# Patient Record
Sex: MALE | Race: NATIVE HAWAIIAN OR OTHER PACIFIC ISLANDER | Employment: FULL TIME | URBAN - METROPOLITAN AREA
[De-identification: names, ages, dates, MRNs, and addresses within clinical notes are randomized per-mention and may not be internally consistent; named-entity substitution may affect disease eponyms.]

---

## 2017-04-17 RX ORDER — INDOMETHACIN 50 MG/1
CAPSULE ORAL
Qty: 20 CAPSULE | Refills: 0 | Status: SHIPPED | OUTPATIENT
Start: 2017-04-17 | End: 2018-11-16

## 2017-05-31 ENCOUNTER — TELEPHONE (OUTPATIENT)
Dept: FAMILY MEDICINE CLINIC | Age: 37
End: 2017-05-31

## 2017-05-31 RX ORDER — COLCHICINE 0.6 MG/1
0.6 TABLET ORAL 2 TIMES DAILY
Qty: 10 TABLET | Refills: 3 | Status: SHIPPED | OUTPATIENT
Start: 2017-05-31 | End: 2019-09-04 | Stop reason: ALTCHOICE

## 2018-05-14 ENCOUNTER — TELEPHONE (OUTPATIENT)
Dept: FAMILY MEDICINE CLINIC | Age: 38
End: 2018-05-14

## 2018-05-18 ENCOUNTER — TELEPHONE (OUTPATIENT)
Dept: FAMILY MEDICINE CLINIC | Age: 38
End: 2018-05-18

## 2018-05-18 DIAGNOSIS — Z00.00 ANNUAL PHYSICAL EXAM: Primary | ICD-10-CM

## 2018-05-25 ENCOUNTER — OFFICE VISIT (OUTPATIENT)
Dept: FAMILY MEDICINE CLINIC | Age: 38
End: 2018-05-25

## 2018-05-25 VITALS
BODY MASS INDEX: 30.69 KG/M2 | SYSTOLIC BLOOD PRESSURE: 130 MMHG | HEART RATE: 83 BPM | OXYGEN SATURATION: 95 % | DIASTOLIC BLOOD PRESSURE: 84 MMHG | WEIGHT: 207.8 LBS | RESPIRATION RATE: 14 BRPM

## 2018-05-25 DIAGNOSIS — Z31.69 INFERTILITY COUNSELING: ICD-10-CM

## 2018-05-25 DIAGNOSIS — A64 STD (MALE): ICD-10-CM

## 2018-05-25 DIAGNOSIS — Z00.00 ANNUAL PHYSICAL EXAM: Primary | ICD-10-CM

## 2018-05-25 PROCEDURE — 99395 PREV VISIT EST AGE 18-39: CPT | Performed by: FAMILY MEDICINE

## 2018-05-25 ASSESSMENT — PATIENT HEALTH QUESTIONNAIRE - PHQ9
SUM OF ALL RESPONSES TO PHQ QUESTIONS 1-9: 0
SUM OF ALL RESPONSES TO PHQ9 QUESTIONS 1 & 2: 0
1. LITTLE INTEREST OR PLEASURE IN DOING THINGS: 0
2. FEELING DOWN, DEPRESSED OR HOPELESS: 0

## 2018-05-28 ASSESSMENT — ENCOUNTER SYMPTOMS
ALLERGIC/IMMUNOLOGIC NEGATIVE: 1
EYES NEGATIVE: 1
GASTROINTESTINAL NEGATIVE: 1
RESPIRATORY NEGATIVE: 1

## 2018-05-29 DIAGNOSIS — A64 STD (MALE): ICD-10-CM

## 2018-05-29 DIAGNOSIS — Z00.00 ANNUAL PHYSICAL EXAM: ICD-10-CM

## 2018-05-29 LAB
ALBUMIN SERPL-MCNC: 4.6 G/DL (ref 3.4–5)
ALP BLD-CCNC: 50 U/L (ref 40–129)
ALT SERPL-CCNC: 38 U/L (ref 10–40)
ANION GAP SERPL CALCULATED.3IONS-SCNC: 14 MMOL/L (ref 3–16)
AST SERPL-CCNC: 33 U/L (ref 15–37)
BILIRUB SERPL-MCNC: 0.7 MG/DL (ref 0–1)
BILIRUBIN DIRECT: <0.2 MG/DL (ref 0–0.3)
BILIRUBIN, INDIRECT: NORMAL MG/DL (ref 0–1)
BUN BLDV-MCNC: 11 MG/DL (ref 7–20)
CALCIUM SERPL-MCNC: 9.2 MG/DL (ref 8.3–10.6)
CHLORIDE BLD-SCNC: 102 MMOL/L (ref 99–110)
CHOLESTEROL, TOTAL: 210 MG/DL (ref 0–199)
CO2: 24 MMOL/L (ref 21–32)
CREAT SERPL-MCNC: 0.9 MG/DL (ref 0.9–1.3)
GFR AFRICAN AMERICAN: >60
GFR NON-AFRICAN AMERICAN: >60
GLUCOSE BLD-MCNC: 129 MG/DL (ref 70–99)
HCT VFR BLD CALC: 47 % (ref 40.5–52.5)
HDLC SERPL-MCNC: 48 MG/DL (ref 40–60)
HEMOGLOBIN: 16.3 G/DL (ref 13.5–17.5)
HEPATITIS B SURFACE ANTIGEN INTERPRETATION: NORMAL
HEPATITIS C ANTIBODY INTERPRETATION: NORMAL
LDL CHOLESTEROL CALCULATED: 120 MG/DL
MCH RBC QN AUTO: 30.8 PG (ref 26–34)
MCHC RBC AUTO-ENTMCNC: 34.7 G/DL (ref 31–36)
MCV RBC AUTO: 88.6 FL (ref 80–100)
PDW BLD-RTO: 13.2 % (ref 12.4–15.4)
PLATELET # BLD: 220 K/UL (ref 135–450)
PMV BLD AUTO: 8.3 FL (ref 5–10.5)
POTASSIUM SERPL-SCNC: 4.5 MMOL/L (ref 3.5–5.1)
RBC # BLD: 5.31 M/UL (ref 4.2–5.9)
SODIUM BLD-SCNC: 140 MMOL/L (ref 136–145)
TOTAL PROTEIN: 6.9 G/DL (ref 6.4–8.2)
TRIGL SERPL-MCNC: 208 MG/DL (ref 0–150)
TSH SERPL DL<=0.05 MIU/L-ACNC: 1.42 UIU/ML (ref 0.27–4.2)
URIC ACID, SERUM: 9.7 MG/DL (ref 3.5–7.2)
VLDLC SERPL CALC-MCNC: 42 MG/DL
WBC # BLD: 6.9 K/UL (ref 4–11)

## 2018-05-30 LAB
C. TRACHOMATIS DNA ,URINE: NEGATIVE
HIV AG/AB: NORMAL
HIV ANTIGEN: NORMAL
HIV-1 ANTIBODY: NORMAL
HIV-2 AB: NORMAL
N. GONORRHOEAE DNA, URINE: NEGATIVE
RPR: NORMAL

## 2018-08-07 ENCOUNTER — OFFICE VISIT (OUTPATIENT)
Dept: FAMILY MEDICINE CLINIC | Age: 38
End: 2018-08-07

## 2018-08-07 VITALS
DIASTOLIC BLOOD PRESSURE: 78 MMHG | RESPIRATION RATE: 14 BRPM | WEIGHT: 204.2 LBS | OXYGEN SATURATION: 95 % | SYSTOLIC BLOOD PRESSURE: 122 MMHG | HEART RATE: 78 BPM | BODY MASS INDEX: 30.16 KG/M2

## 2018-08-07 DIAGNOSIS — M25.512 ACUTE PAIN OF LEFT SHOULDER: Primary | ICD-10-CM

## 2018-08-07 PROCEDURE — G8427 DOCREV CUR MEDS BY ELIG CLIN: HCPCS | Performed by: FAMILY MEDICINE

## 2018-08-07 PROCEDURE — G8417 CALC BMI ABV UP PARAM F/U: HCPCS | Performed by: FAMILY MEDICINE

## 2018-08-07 PROCEDURE — 99214 OFFICE O/P EST MOD 30 MIN: CPT | Performed by: FAMILY MEDICINE

## 2018-08-07 PROCEDURE — 4004F PT TOBACCO SCREEN RCVD TLK: CPT | Performed by: FAMILY MEDICINE

## 2018-08-07 RX ORDER — MELOXICAM 15 MG/1
15 TABLET ORAL DAILY
Qty: 30 TABLET | Refills: 3 | Status: SHIPPED | OUTPATIENT
Start: 2018-08-07 | End: 2018-11-16

## 2018-08-07 ASSESSMENT — ENCOUNTER SYMPTOMS
EYES NEGATIVE: 1
RESPIRATORY NEGATIVE: 1
GASTROINTESTINAL NEGATIVE: 1

## 2018-08-07 NOTE — PROGRESS NOTES
deformity, no laceration, no spasm and normal pulse. Neurological: He is alert and oriented to person, place, and time. Vitals reviewed. ASSESSMENT:   Diagnosis Orders   1.  Acute pain of left shoulder  meloxicam (MOBIC) 15 MG tablet       PLAN:  See orders, exercise sheet is given to the patient  If no improvement my need to see Ortho

## 2018-08-07 NOTE — PATIENT INSTRUCTIONS
Patient Education        Shoulder Pain: Care Instructions  Your Care Instructions    You can hurt your shoulder by using it too much during an activity, such as fishing or baseball. It can also happen as part of the everyday wear and tear of getting older. Shoulder injuries can be slow to heal, but your shoulder should get better with time. Your doctor may recommend a sling to rest your shoulder. If you have injured your shoulder, you may need testing and treatment. Follow-up care is a key part of your treatment and safety. Be sure to make and go to all appointments, and call your doctor if you are having problems. It's also a good idea to know your test results and keep a list of the medicines you take. How can you care for yourself at home? · Take pain medicines exactly as directed. ¨ If the doctor gave you a prescription medicine for pain, take it as prescribed. ¨ If you are not taking a prescription pain medicine, ask your doctor if you can take an over-the-counter medicine. ¨ Do not take two or more pain medicines at the same time unless the doctor told you to. Many pain medicines contain acetaminophen, which is Tylenol. Too much acetaminophen (Tylenol) can be harmful. · If your doctor recommends that you wear a sling, use it as directed. Do not take it off before your doctor tells you to. · Put ice or a cold pack on the sore area for 10 to 20 minutes at a time. Put a thin cloth between the ice and your skin. · If there is no swelling, you can put moist heat, a heating pad, or a warm cloth on your shoulder. Some doctors suggest alternating between hot and cold. · Rest your shoulder for a few days. If your doctor recommends it, you can then begin gentle exercise of the shoulder, but do not lift anything heavy. When should you call for help? Call 911 anytime you think you may need emergency care. For example, call if:    · You have chest pain or pressure.  This may occur with:  ¨ Sweating. ¨ Shortness of breath. ¨ Nausea or vomiting. ¨ Pain that spreads from the chest to the neck, jaw, or one or both shoulders or arms. ¨ Dizziness or lightheadedness. ¨ A fast or uneven pulse. After calling 911, chew 1 adult-strength aspirin. Wait for an ambulance. Do not try to drive yourself.     · Your arm or hand is cool or pale or changes color.    Call your doctor now or seek immediate medical care if:    · You have signs of infection, such as:  ¨ Increased pain, swelling, warmth, or redness in your shoulder. ¨ Red streaks leading from a place on your shoulder. ¨ Pus draining from an area of your shoulder. ¨ Swollen lymph nodes in your neck, armpits, or groin. ¨ A fever.    Watch closely for changes in your health, and be sure to contact your doctor if:    · You cannot use your shoulder.     · Your shoulder does not get better as expected. Where can you learn more? Go to https://OROS.Flocktory. org and sign in to your [employer] account. Enter Z046 in the Pro V&V box to learn more about \"Shoulder Pain: Care Instructions. \"     If you do not have an account, please click on the \"Sign Up Now\" link. Current as of: November 29, 2017  Content Version: 11.6  © 0714-3220 [employer]. Care instructions adapted under license by [facility] ([facility]). If you have questions about a medical condition or this instruction, always ask your healthcare professional. [place] [address] any warranty or liability for your use of this information. Patient Education        Shoulder Stretches: Exercises  Your Care Instructions  Here are some examples of exercises for your shoulder. Start each exercise slowly. Ease off the exercise if you start to have pain. Your doctor or physical therapist will tell you when you can start these exercises and which ones will work best for you.   How to do the exercises  Shoulder stretch    1.  a doorway and

## 2018-08-24 ENCOUNTER — OFFICE VISIT (OUTPATIENT)
Dept: FAMILY MEDICINE CLINIC | Age: 38
End: 2018-08-24

## 2018-08-24 VITALS
DIASTOLIC BLOOD PRESSURE: 62 MMHG | RESPIRATION RATE: 14 BRPM | OXYGEN SATURATION: 97 % | BODY MASS INDEX: 30.95 KG/M2 | HEART RATE: 62 BPM | WEIGHT: 209.6 LBS | SYSTOLIC BLOOD PRESSURE: 108 MMHG

## 2018-08-24 DIAGNOSIS — M25.512 ACUTE PAIN OF LEFT SHOULDER: Primary | ICD-10-CM

## 2018-08-24 PROCEDURE — G8427 DOCREV CUR MEDS BY ELIG CLIN: HCPCS | Performed by: FAMILY MEDICINE

## 2018-08-24 PROCEDURE — G8417 CALC BMI ABV UP PARAM F/U: HCPCS | Performed by: FAMILY MEDICINE

## 2018-08-24 PROCEDURE — 4004F PT TOBACCO SCREEN RCVD TLK: CPT | Performed by: FAMILY MEDICINE

## 2018-08-24 PROCEDURE — 99212 OFFICE O/P EST SF 10 MIN: CPT | Performed by: FAMILY MEDICINE

## 2018-08-24 ASSESSMENT — ENCOUNTER SYMPTOMS
RESPIRATORY NEGATIVE: 1
EYES NEGATIVE: 1
GASTROINTESTINAL NEGATIVE: 1

## 2018-08-24 NOTE — PROGRESS NOTES
SUBJECTIVE:  Patient ID: Marvin Stevens is a 41 y.o. y.o. male     HPI   Shoulder Pain: Patient is here for follow up on shoulder pain. This is evaluated as a personal injury. The pain is described as aching and sharp. The onset of the pain was gradual, starting about 3 weeks ago. The pain occurs when active and lasts 2 hours. Location is lateral. No history of dislocation. Symptoms are aggravated by lifting, throwing, twisting. Symptoms are diminished by  rest.   PT and mobic didn't help any with his pain still there can't sleep. 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       Social History Main Topics    Smoking status: Former Smoker     Quit date: 9/21/2010    Smokeless tobacco: Current User     Types: Chew    Alcohol use Yes      Comment: binge drinker on weekends    Drug use: No    Sexual activity: Not Asked     Other Topics Concern    None     Social History Narrative    None     Current Outpatient Prescriptions   Medication Sig Dispense Refill    meloxicam (MOBIC) 15 MG tablet Take 1 tablet by mouth daily 30 tablet 3    colchicine (COLCRYS) 0.6 MG tablet Take 1 tablet by mouth 2 times daily 10 tablet 3    indomethacin (INDOCIN) 50 MG capsule TAKE ONE CAPSULE BY MOUTH TWICE A DAY WITH MEALS 20 capsule 0    NONFORMULARY Medication that starts with an H to stop diarrhea.       ondansetron (ZOFRAN ODT) 4 MG disintegrating tablet Take 1 tablet by mouth every 8 hours as needed for Nausea or Vomiting 20 tablet 0    ibuprofen (ADVIL) 200 MG tablet Take 2 tablets by mouth every 8 hours as needed for Pain 60

## 2018-08-31 ENCOUNTER — OFFICE VISIT (OUTPATIENT)
Dept: ORTHOPEDIC SURGERY | Age: 38
End: 2018-08-31

## 2018-08-31 VITALS
WEIGHT: 209 LBS | BODY MASS INDEX: 31.67 KG/M2 | SYSTOLIC BLOOD PRESSURE: 130 MMHG | DIASTOLIC BLOOD PRESSURE: 88 MMHG | HEART RATE: 80 BPM | HEIGHT: 68 IN

## 2018-08-31 DIAGNOSIS — M25.512 LEFT SHOULDER PAIN, UNSPECIFIED CHRONICITY: Primary | ICD-10-CM

## 2018-08-31 DIAGNOSIS — M75.42 ROTATOR CUFF IMPINGEMENT SYNDROME, LEFT: ICD-10-CM

## 2018-08-31 PROCEDURE — 20610 DRAIN/INJ JOINT/BURSA W/O US: CPT | Performed by: ORTHOPAEDIC SURGERY

## 2018-08-31 PROCEDURE — 99203 OFFICE O/P NEW LOW 30 MIN: CPT | Performed by: ORTHOPAEDIC SURGERY

## 2018-08-31 NOTE — PROGRESS NOTES
[address]  Left  Upper Extremity Pain    Chief Complaint    Shoulder Pain (OP/NP left shoulder pain for about a month, states one day he woke up and felt pain in shoulder-then he went to his PCP Dr Clark Lazo given Meloxicam did not help)      History of Present Illness:  Terence Mueller is a 41 y.o. male presents today with left shoulder pain. His progressive shoulder pain has been worsening over the last couple months. He said the pain is bothersome with overhead activities. He has trouble sleeping at night on that side because of the pain. Denies any recent injuries. 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 He denies any instability or paresthesias or numbness. Certain activities and our motions cause him pain. He's had no treatment except for meloxicam which did not seem to help. Pain Assessment  Location of Pain: Shoulder  Location Modifiers: Left  Severity of Pain: 4  Quality of Pain: Aching  Duration of Pain: A few minutes  Frequency of Pain: Intermittent  Aggravating Factors:  (extending arm, certain ROM)  Limiting Behavior: Yes  Result of Injury: No  Work-Related Injury: No  Are there other pain locations you wish to document?: No     Medical History:    No notes on file    Patient's medications, allergies, past medical, surgical, social and family histories were reviewed and updated as appropriate.     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       Social History Main Topics    Smoking status: Former Smoker     Quit date: 9/21/2010    Smokeless tobacco: Current User     Types: Chew    Alcohol use Yes      Comment: binge drinker on weekends    Drug use: No    Sexual activity: Not on file     Other Topics is symmetry without atrophy, no obvious winging    Palpation: Tenderness in the anterior subacromial space, no bicipital groove tenderness no acromioclavicular or sternoclavicular joint tenderness    Active/Passive ROM: Full active forward elevation, external rotation with some limitation in internal rotation compared to contralateral side    Strength: 4/5 strength testing of the supraspinatus and infraspinatus, 5 out of 5 subscapularis and teres minor    Stability: negative sulcus sign, no evidence of instability    Neurovascular: Neurovascularly intact    Special Tests: Positive Cardenas negative speeds negative Wilkin's negative Yergason negative crossarm      Right comparison shoulder exam    Inspection:  No gross deformities, periscapular musculature is symmetry without atrophy, no obvious winging    Palpation: Nontender to palpation about the acromioclavicular joint, rotator cuff footprint or over the biceps groove, or any other bony architecture    Active/Passive ROM: full in forward flexion, abduction, external rotation with the elbow at the side, and internal rotation    Strength: 5/5 strength testing of deltoid, supraspinatus, infraspinatus, teres minor, and subscapularis    Stability: negative sulcus sign, no evidence of instability    Neurovascular: Neurovascularly intact      Imaging: The complete series x-ray of the left shoulder was obtained and reviewed and demonstrates no evidence of acute fracture, soft tissue calcification or any other acute osseous pathology. He doesn't take 2 acromion    Procedure:  After informed consent was provided, the skin was cleansed and prepped. Using a 25 gauge needle an injection with 2 mL of 40 mg/ml Depo-Medrol and 4 mL of 0.5% ropivacaine was injected without difficulty. The needle was withdrawn and the puncture site sealed with a Band-Aid. The patient tolerated the procedure well.     Assessment :  Left shoulder pain, rotator cuff

## 2018-09-06 ENCOUNTER — HOSPITAL ENCOUNTER (OUTPATIENT)
Dept: PHYSICAL THERAPY | Age: 38
Setting detail: THERAPIES SERIES
Discharge: HOME | End: 2018-09-06
Payer: COMMERCIAL

## 2018-09-06 PROCEDURE — 97110 THERAPEUTIC EXERCISES: CPT | Performed by: PHYSICAL THERAPIST

## 2018-09-06 PROCEDURE — G8985 CARRY GOAL STATUS: HCPCS | Performed by: PHYSICAL THERAPIST

## 2018-09-06 PROCEDURE — 97140 MANUAL THERAPY 1/> REGIONS: CPT | Performed by: PHYSICAL THERAPIST

## 2018-09-06 PROCEDURE — G8984 CARRY CURRENT STATUS: HCPCS | Performed by: PHYSICAL THERAPIST

## 2018-09-06 PROCEDURE — G0283 ELEC STIM OTHER THAN WOUND: HCPCS | Performed by: PHYSICAL THERAPIST

## 2018-09-06 PROCEDURE — 97161 PT EVAL LOW COMPLEX 20 MIN: CPT | Performed by: PHYSICAL THERAPIST

## 2018-09-06 NOTE — FLOWSHEET NOTE
strengthening, flexibility, endurance, ROM of scapular, scapulothoracic and UE control with self care, reaching, carrying, lifting, house/yardwork, driving/computer work  [x] (80946) Reviewed/Progressed HEP activities related to improving balance, coordination, kinesthetic sense, posture, motor skill, proprioception of scapular, scapulothoracic and UE control with self care, reaching, carrying, lifting, house/yardwork, driving/computer work      Manual Treatments:  PROM / STM / Oscillations-Mobs:  G-I, II, III, IV (PA's, Inf., Post.)  [x] (92116) Provided manual therapy to mobilize soft tissue/joints of cervical/CT, scapular GHJ and UE for the purpose of modulating pain, promoting relaxation,  increasing ROM, reducing/eliminating soft tissue swelling/inflammation/restriction, improving soft tissue extensibility and allowing for proper ROM for normal function with self care, reaching, carrying, lifting, house/yardwork, driving/computer work    Modalities:   [] hot packs                    [] EMS                           [] Ultrasound  [x] ice                               [] vasopneumatic          [] high volt/EGS  [] phono        [] tens                           [] ionto  [] autorange/biodex        [x] Interferential               [x] other: CP         Charges:  Timed Code Treatment Minutes: 30'   Total Treatment Minutes: 76'       [x] EVAL (LOW) 55049 (typically 20 minutes face-to-face)  [] EVAL (MOD) 32096 (typically 30 minutes face-to-face)  [] EVAL (HIGH) 16713 (typically 45 minutes face-to-face)  [] RE-EVAL     [x] EE(09336) x  1   [] IONTO  [] NMR (59461) x      [] VASO  [x] Manual (42634) x  1    [] Other:  [] TA x       [] Mech Traction (17480)  [] ES(attended) (99090)      [x] ES (un) (36802):     GOALS:  Patient stated goal: Patient would like to be able to work pain free with his lifting and carrying.     Therapist goals for Patient:   Short Term Goals: To be achieved in: 2 weeks  1.  Independent in HEP and progression per patient tolerance, in order to prevent re-injury. 2. Patient will have a decrease in pain to facilitate improvement in movement, function, and ADLs as indicated by Functional Deficits.     Long Term Goals: To be achieved in: 12 weeks  1. Disability index score of 15% or less for the UEFS to assist with reaching prior level of function. 2. Patient will demonstrate increased AROM to 0-180 deg of elevation and 0-90- /0-70 deg of ROT accordingly to allow for proper joint functioning as indicated by patients Functional Deficits. 3. Patient will demonstrate an increase in Strength to good scapular and core control  for UE to allow for proper functional mobility as indicated by patients Functional Deficits/ Biodex testing for RTC strength for bilateral the TQBW ratios. 4. Patient will return to 85%> functional activities without increased symptoms or restriction. 5. Patient will return to full pain free lifting and carrying at work. Progression Towards Functional goals:  [] Patient is progressing as expected towards functional goals listed. [] Progression is slowed due to complexities listed. [] Progression has been slowed due to co-morbidities.   [x] Plan just implemented, too soon to assess goals progression  [] Other:     ASSESSMENT:   Functional Impairments              []Noted spinal or UE joint hypomobility              []Noted spinal or UE joint hypermobility              [x]Decreased UE functional ROM              [x]Decreased UE functional strength              []Abnormal reflexes/sensation/myotomal/dermatomal deficits              [x]Decreased RC/scapular/core strength and neuromuscular control              []other:       Functional Activity Limitations (from functional questionnaire and intake)              [x]Reduced ability to tolerate prolonged functional positions              [x]Reduced ability or difficulty with changes of positions or transfers between positions

## 2018-09-06 NOTE — PLAN OF CARE
[facility] [address] 55      Physical Therapy Certification    Dear Selene Gayle MD,    We had the pleasure of evaluating the following patient for physical therapy services at [address] [place] [address]. A summary of our findings can be found in the initial assessment below. This includes our plan of care. If you have any questions or concerns regarding these findings, please do not hesitate to contact me at the office phone number checked above. Thank you for the referral.       Physician Signature:_______________________________Date:__________________  By signing above (or electronic signature), therapists plan is approved by physician      Patient: Mayelin Chaney   : 1980   MRN: [id number]  Referring Physician: Referring Practitioner: Selene Gayle MD      Evaluation Date: 2018      Medical Diagnosis Information:  Diagnosis: M75.42 (ICD-10-CM) - Rotator cuff impingement syndrome, left; M25.512 (ICD-10-CM) - Left shoulder pain, unspecified chronicity   Treatment Diagnosis: Shoulder pain; limited ROM; UE muscle weakness                                           Insurance information:  [employer]; $2700/ 5800 DEDUCTIBLE/ OOP MAX; CO-INSURANCE 60/40%; 30 VPCY; REF #[id number]/ PAG    Precautions/ Contra-indications: Impingement protection principles; post cortisone injection  Latex Allergy:  [x]NO      []YES  Preferred Language for Healthcare:   [x]English       []other:    SUBJECTIVE: Mayelin Chaney is a 41 y.o. male presents today with left shoulder pain. His progressive shoulder pain has been worsening over the last couple months. He said the pain is bothersome with overhead activities. He has trouble sleeping at night on that side because of the pain. Denies any recent injuries. 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 He denies any instability or paresthesias or numbness.   Certain activities and our motions cause him pain. He's had no treatment except for meloxicam which did not seem to help. He presents today with a referral to initiate PT services and activity modification. Relevant Medical History: Benign medical history by self report; prior history for LBP and R shoulder impingement  Functional Disability Index:PT G-Codes  Functional Assessment Tool Used: UEFI  Score: 62/ 80= 78%  Functional Limitation: Carrying, moving and handling objects  Carrying, Moving and Handling Objects Current Status (): At least 20 percent but less than 40 percent impaired, limited or restricted  Carrying, Moving and Handling Objects Goal Status (): 0 percent impaired, limited or restricted    Pain Scale: 0/10 @ rest  7/ 10 @ worst; with shoulder movement  Easing factors: Rest; ice; injection  Provocative factors: Positional changes; overhead; lifting;     Type: []Constant   [x]Intermittent  []Radiating []Localized []other:     Numbness/Tingling: None    Occupation/School:  [employer] (in[employer] control)    Living Status/Prior Level of Function: Independent with ADLs and IADLs, Full work and fitness activity.     OBJECTIVE:     UEFI Score: 62/ 80= 78% (9/6/18)    9-6-18  ROM PROM AROM  Comment    L R L R    Flexion 170 180 160 170    Abduction 170 180 160 170    ER 90 100 80 90    IR 50 60 40 50    Other(cervical) Tight; limited SB/ ROT Tight; mild limited SB/ ROT      Other           9-6-18  Strength L R Comment   Flexion 4+/5 5/5    Abduction 4+/5 5/5    ER 4-/5 5-/5    IR 4/5 5-/5    Supraspinatus 4/5 5-/5    Upper Trap 4+/5 5/5    Lower Trap      Mid Trap      Rhomboids      Biceps 4+/5 5/5    Triceps 4+/5 5/5    Horizontal Abduction 4/5 5-/5    Horizontal Adduction 4+/5 5/5    Lats        9-6-18  Special Tests Results/Comment   Omer (+)   Neers (+)   Speeds    OBriens    Other (+) painful arc ABD> FLEX   Neurologic Signs (-)   Functional Reach      Reflexes/Sensation: education/learning barriers              []Environmental, home barriers              []profession/work barriers  [x]past PT/medical experience  []other:  Justification: Wants to manage without medications; prior history for R shoulder impingement; RTC and scapular imbalances    Falls Risk Assessment (30 days):   [x] Falls Risk assessed and no intervention required. [] Falls Risk assessed and Patient requires intervention due to being higher risk   TUG score (>12s at risk):     [] Falls education provided, including       ASSESSMENT:   Functional Impairments   []Noted spinal or UE joint hypomobility   []Noted spinal or UE joint hypermobility   [x]Decreased UE functional ROM   [x]Decreased UE functional strength   []Abnormal reflexes/sensation/myotomal/dermatomal deficits   [x]Decreased RC/scapular/core strength and neuromuscular control   []other:      Functional Activity Limitations (from functional questionnaire and intake)   [x]Reduced ability to tolerate prolonged functional positions   [x]Reduced ability or difficulty with changes of positions or transfers between positions   []Reduced ability to maintain good posture and demonstrate good body mechanics with sitting, bending, and lifting   [] Reduced ability or tolerance with driving and/or computer work   [x]Reduced ability to sleep   [x]Reduced ability to perform lifting, reaching, carrying tasks   [x]Reduced ability to tolerate impact through UE   [x]Reduced ability to reach behind back   []Reduced ability to  or hold objects   [x]Reduced ability to throw or toss an object   []other:      Participation Restrictions   []Reduced participation in self care activities   []Reduced participation in home management activities   [x]Reduced participation in work activities   [x]Reduced participation in social activities. [x]Reduced participation in sport / recreational activities.     Classification:   []Signs/symptoms consistent with post-surgical status functional activities without increased symptoms or restriction. 5. Patient will return to full pain free lifting and carrying at work.       Electronically signed by:  Emmanuel Nino PT

## 2018-09-10 ENCOUNTER — HOSPITAL ENCOUNTER (OUTPATIENT)
Dept: PHYSICAL THERAPY | Age: 38
Setting detail: THERAPIES SERIES
Discharge: HOME | End: 2018-09-10
Payer: COMMERCIAL

## 2018-09-10 NOTE — FLOWSHEET NOTE
The [address]    Physical Therapy  Cancellation/No-show Note  Patient Name:  Efe Caruso  :  1980   Date:  9/10/2018  Cancelled visits to date: 1  No-shows to date: 0    For today's appointment patient:  [x]  Cancelled  []  Rescheduled appointment  []  No-show     Reason given by patient:  []  Patient ill  []  Conflicting appointment   []  No transportation    [x]  Conflict with work  []  No reason given  []  Other:     Comments:      Electronically signed by:  Shasta Antony PTA

## 2018-09-21 ENCOUNTER — APPOINTMENT (OUTPATIENT)
Dept: PHYSICAL THERAPY | Age: 38
End: 2018-09-21
Payer: COMMERCIAL

## 2018-09-21 ENCOUNTER — HOSPITAL ENCOUNTER (OUTPATIENT)
Dept: PHYSICAL THERAPY | Age: 38
Setting detail: THERAPIES SERIES
Discharge: HOME | End: 2018-09-21
Payer: COMMERCIAL

## 2018-09-21 PROCEDURE — 97530 THERAPEUTIC ACTIVITIES: CPT

## 2018-09-21 PROCEDURE — 97112 NEUROMUSCULAR REEDUCATION: CPT

## 2018-09-21 PROCEDURE — 97110 THERAPEUTIC EXERCISES: CPT

## 2018-09-21 NOTE — FLOWSHEET NOTE
Tight; limited SB/ ROT Tight; mild limited SB/ ROT         Other                 9-6-18  Strength L R Comment   Flexion 4+/5 5/5     Abduction 4+/5 5/5     ER 4-/5 5-/5     IR 4/5 5-/5     Supraspinatus 4/5 5-/5     Upper Trap 4+/5 5/5     Lower Trap         Mid Trap         Rhomboids         Biceps 4+/5 5/5     Triceps 4+/5 5/5     Horizontal Abduction 4/5 5-/5     Horizontal Adduction 4+/5 5/5     Lats            9-6-18  Special Tests Results/Comment   Omer (+)   Neers (+)   Speeds     OBriens     Other (+) painful arc ABD> FLEX   Neurologic Signs (-)   Functional Reach        Reflexes/Sensation:               [x]Dermatomes/Myotomes intact               [x]Reflexes equal and normal bilaterally              []Other:     Joint mobility:               [x]Normal               []Hypo              []Hyper     Palpation: Tenderness to palpation along the greater tuberosity     Functional Mobility/Transfers: Independent     Posture:  Forward posture; stooped shoulers R>L (RHD)     Bandages/Dressings/Incisions: None; NA     Gait:  WNL     Orthopedic Special Tests: See above     RESTRICTIONS/PRECAUTIONS: Impingement protection principles    Exercises/Interventions:   Exercises:  Exercise/Equipment Resistance/Repetitions Other comments 9/21/2018   Stretching/PROM      Wand      Table Slides      UE [place]      Pulleys      Pendulum      Genie 10-30 sec x5 L hand to opposite axilla x         Isometrics      Retraction            Weight shift      Flexion      Abduction      External Rotation      Internal Rotation      Biceps      Triceps            PRE's      Flexion      Abduction      External Rotation 3x 10 3# with towel roll; sidelying x   Internal Rotation 3x 10 7#; sidelying x   Shrugs      EXT      Reverse Flys      Serratus 3 x 10 5# x   Horizontal Abd with ER      Biceps      Triceps      Retraction            Cable Column/Theraband      External Rotation 10 x 10\" walkout Blue x   Internal Rotation 10 x 10\" walkout Black x   Shrugs      Lats      Ext 3x 10 Blue x   Flex      Scapular Retraction 3x 10 Black x   BIC      TRIC      PNF            Dynamic Stability            [place]            Manual interventions      IR/ ER 8' Manual blocks                Therapeutic Exercise and NMR EXR  [x] (72849) Provided verbal/tactile cueing for activities related to strengthening, flexibility, endurance, ROM  for improvements in scapular, scapulothoracic and UE control with self care, reaching, carrying, lifting, house/yardwork, driving/computer work. [x] (90712) Provided verbal/tactile cueing for activities related to improving balance, coordination, kinesthetic sense, posture, motor skill, proprioception  to assist with  scapular, scapulothoracic and UE control with self care, reaching, carrying, lifting, house/yardwork, driving/computer work. Therapeutic Activities:    [x] (51966 or 94190) Provided verbal/tactile cueing for activities related to improving balance, coordination, kinesthetic sense, posture, motor skill, proprioception and motor activation to allow for proper function of scapular, scapulothoracic and UE control with self care, carrying, lifting, driving/computer work.      Home Exercise Program:    [x] (73023) Reviewed/Progressed HEP activities related to strengthening, flexibility, endurance, ROM of scapular, scapulothoracic and UE control with self care, reaching, carrying, lifting, house/yardwork, driving/computer work  [x] (81806) Reviewed/Progressed HEP activities related to improving balance, coordination, kinesthetic sense, posture, motor skill, proprioception of scapular, scapulothoracic and UE control with self care, reaching, carrying, lifting, house/yardwork, driving/computer work      Manual Treatments:  PROM / STM / Oscillations-Mobs:  G-I, II, III, IV (PA's, Inf., Post.)  [x] (06817) Provided manual therapy to mobilize soft tissue/joints of cervical/CT, scapular GHJ and UE for the purpose of modulating pain, promoting relaxation,  increasing ROM, reducing/eliminating soft tissue swelling/inflammation/restriction, improving soft tissue extensibility and allowing for proper ROM for normal function with self care, reaching, carrying, lifting, house/yardwork, driving/computer work    Modalities:   [] hot packs                    [] EMS                           [] Ultrasound  [] ice                               [] vasopneumatic          [] high volt/EGS  [] phono        [] tens                           [] ionto  [] autorange/biodex        [] Interferential               [x] other: Declined        Charges:  Timed Code Treatment Minutes: 40'   Total Treatment Minutes: 48'       [] EVAL (LOW) 455 1011 (typically 20 minutes face-to-face)  [] EVAL (MOD) 14708 (typically 30 minutes face-to-face)  [] EVAL (HIGH) 31325 (typically 45 minutes face-to-face)  [] RE-EVAL     [x] BG(37522) x  1   [] IONTO  [x] NMR (78912) x  1   [] VASO  [] Manual (87614) x       [] Other:  [x] TA x  1    [] Mech Traction (40706)  [] ES(attended) (06969)      [] ES (un) (99661):     GOALS:  Patient stated goal: Patient would like to be able to work pain free with his lifting and carrying.     Therapist goals for Patient:   Short Term Goals: To be achieved in: 2 weeks  1. Independent in HEP and progression per patient tolerance, in order to prevent re-injury. 2. Patient will have a decrease in pain to facilitate improvement in movement, function, and ADLs as indicated by Functional Deficits.     Long Term Goals: To be achieved in: 12 weeks  1. Disability index score of 15% or less for the UEFS to assist with reaching prior level of function. 2. Patient will demonstrate increased AROM to 0-180 deg of elevation and 0-90- /0-70 deg of ROT accordingly to allow for proper joint functioning as indicated by patients Functional Deficits.    3. Patient will demonstrate an increase in Strength to good scapular and core control  for UE to allow for proper functional mobility as indicated by patients Functional Deficits/ Biodex testing for RTC strength for bilateral the TQBW ratios. 4. Patient will return to 85%> functional activities without increased symptoms or restriction. 5. Patient will return to full pain free lifting and carrying at work. Progression Towards Functional goals:  [] Patient is progressing as expected towards functional goals listed. [] Progression is slowed due to complexities listed. [] Progression has been slowed due to co-morbidities.   [x] Plan just implemented, too soon to assess goals progression  [] Other:     ASSESSMENT:   Functional Impairments              []Noted spinal or UE joint hypomobility              []Noted spinal or UE joint hypermobility              [x]Decreased UE functional ROM              [x]Decreased UE functional strength              []Abnormal reflexes/sensation/myotomal/dermatomal deficits              [x]Decreased RC/scapular/core strength and neuromuscular control              []other:       Functional Activity Limitations (from functional questionnaire and intake)              [x]Reduced ability to tolerate prolonged functional positions              [x]Reduced ability or difficulty with changes of positions or transfers between positions              []Reduced ability to maintain good posture and demonstrate good body mechanics with sitting, bending, and lifting              [] Reduced ability or tolerance with driving and/or computer work              [x]Reduced ability to sleep              [x]Reduced ability to perform lifting, reaching, carrying tasks              [x]Reduced ability to tolerate impact through UE              [x]Reduced ability to reach behind back              []Reduced ability to  or hold objects              [x]Reduced ability to throw or toss an object              []other:       Participation Restrictions              []Reduced participation in self

## 2018-09-26 ENCOUNTER — HOSPITAL ENCOUNTER (OUTPATIENT)
Dept: PHYSICAL THERAPY | Age: 38
Setting detail: THERAPIES SERIES
Discharge: HOME | End: 2018-09-26
Payer: COMMERCIAL

## 2018-10-19 ENCOUNTER — TELEPHONE (OUTPATIENT)
Dept: FAMILY MEDICINE CLINIC | Age: 38
End: 2018-10-19

## 2018-10-19 RX ORDER — METHYLPREDNISOLONE 4 MG/1
TABLET ORAL
Qty: 1 KIT | Refills: 0 | Status: SHIPPED | OUTPATIENT
Start: 2018-10-19 | End: 2018-11-16 | Stop reason: ALTCHOICE

## 2018-11-06 ENCOUNTER — HOSPITAL ENCOUNTER (OUTPATIENT)
Dept: PHYSICAL THERAPY | Age: 38
Setting detail: THERAPIES SERIES
Discharge: HOME | End: 2018-11-06
Payer: COMMERCIAL

## 2018-11-06 NOTE — FLOWSHEET NOTE
[facility], [place]    Physical Therapy  Cancellation/No-show Note  Patient Name:  Fermín Nieves  :  1980   Date:  2018  Cancelled visits to date: 0  No-shows to date: 1    For today's appointment patient:  []  Cancelled  []  Rescheduled appointment  [x]  No-show     Reason given by patient:  []  Patient ill  []  Conflicting appointment  []  No transportation    []  Conflict with work  [x]  No reason given  []  Other:     Comments: Called and left VM with pt. This was to be patients initial evaluation here in physical therapy.     Electronically signed by:  Wayne Thapa PT

## 2018-11-16 ENCOUNTER — OFFICE VISIT (OUTPATIENT)
Dept: FAMILY MEDICINE CLINIC | Age: 38
End: 2018-11-16
Payer: COMMERCIAL

## 2018-11-16 ENCOUNTER — TELEPHONE (OUTPATIENT)
Dept: FAMILY MEDICINE CLINIC | Age: 38
End: 2018-11-16

## 2018-11-16 VITALS
HEART RATE: 96 BPM | SYSTOLIC BLOOD PRESSURE: 122 MMHG | BODY MASS INDEX: 32.39 KG/M2 | DIASTOLIC BLOOD PRESSURE: 84 MMHG | WEIGHT: 213 LBS

## 2018-11-16 DIAGNOSIS — Z83.3 FAMILY HISTORY OF DIABETES MELLITUS (DM): ICD-10-CM

## 2018-11-16 DIAGNOSIS — M10.071 ACUTE IDIOPATHIC GOUT INVOLVING TOE OF RIGHT FOOT: Primary | ICD-10-CM

## 2018-11-16 PROCEDURE — 99213 OFFICE O/P EST LOW 20 MIN: CPT | Performed by: FAMILY MEDICINE

## 2018-11-16 RX ORDER — INDOMETHACIN 50 MG/1
50 CAPSULE ORAL 2 TIMES DAILY WITH MEALS
Qty: 60 CAPSULE | Refills: 1 | Status: SHIPPED | OUTPATIENT
Start: 2018-11-16 | End: 2019-09-04 | Stop reason: ALTCHOICE

## 2018-11-17 LAB
ANION GAP SERPL CALCULATED.3IONS-SCNC: 17 MMOL/L (ref 3–16)
BUN BLDV-MCNC: 13 MG/DL (ref 7–20)
CALCIUM SERPL-MCNC: 9.6 MG/DL (ref 8.3–10.6)
CHLORIDE BLD-SCNC: 100 MMOL/L (ref 99–110)
CO2: 24 MMOL/L (ref 21–32)
CREAT SERPL-MCNC: 1.3 MG/DL (ref 0.9–1.3)
ESTIMATED AVERAGE GLUCOSE: 131.2 MG/DL
GFR AFRICAN AMERICAN: >60
GFR NON-AFRICAN AMERICAN: >60
GLUCOSE BLD-MCNC: 105 MG/DL (ref 70–99)
HBA1C MFR BLD: 6.2 %
POTASSIUM SERPL-SCNC: 4.1 MMOL/L (ref 3.5–5.1)
SODIUM BLD-SCNC: 141 MMOL/L (ref 136–145)
URIC ACID, SERUM: 8.3 MG/DL (ref 3.5–7.2)

## 2018-11-19 ASSESSMENT — ENCOUNTER SYMPTOMS
RESPIRATORY NEGATIVE: 1
GASTROINTESTINAL NEGATIVE: 1
EYES NEGATIVE: 1

## 2019-01-16 ASSESSMENT — PAIN SCALES - GENERAL: PAINLEVEL_OUTOF10: 10

## 2019-01-17 ENCOUNTER — HOSPITAL ENCOUNTER (EMERGENCY)
Age: 39
Discharge: HOME | End: 2019-01-17
Payer: COMMERCIAL

## 2019-01-17 ENCOUNTER — APPOINTMENT (OUTPATIENT)
Dept: GENERAL RADIOLOGY | Age: 39
End: 2019-01-17
Payer: COMMERCIAL

## 2019-01-17 VITALS
RESPIRATION RATE: 16 BRPM | DIASTOLIC BLOOD PRESSURE: 71 MMHG | OXYGEN SATURATION: 98 % | TEMPERATURE: 98.1 F | SYSTOLIC BLOOD PRESSURE: 119 MMHG | HEART RATE: 86 BPM

## 2019-01-17 DIAGNOSIS — S86.912A KNEE STRAIN, LEFT, INITIAL ENCOUNTER: Primary | ICD-10-CM

## 2019-01-17 PROCEDURE — 73560 X-RAY EXAM OF KNEE 1 OR 2: CPT

## 2019-01-17 PROCEDURE — 6370000000 HC RX 637 (ALT 250 FOR IP): Performed by: PHYSICIAN ASSISTANT

## 2019-01-17 PROCEDURE — 99283 EMERGENCY DEPT VISIT LOW MDM: CPT

## 2019-01-17 RX ORDER — OXYCODONE HYDROCHLORIDE AND ACETAMINOPHEN 5; 325 MG/1; MG/1
1 TABLET ORAL ONCE
Status: COMPLETED | OUTPATIENT
Start: 2019-01-17 | End: 2019-01-17

## 2019-01-17 RX ORDER — IBUPROFEN 800 MG/1
800 TABLET ORAL EVERY 8 HOURS PRN
Qty: 20 TABLET | Refills: 0 | Status: SHIPPED | OUTPATIENT
Start: 2019-01-17 | End: 2019-09-20

## 2019-01-17 RX ADMIN — OXYCODONE AND ACETAMINOPHEN 1 TABLET: 5; 325 TABLET ORAL at 01:27

## 2019-01-17 ASSESSMENT — ENCOUNTER SYMPTOMS
SHORTNESS OF BREATH: 0
COLOR CHANGE: 0
NAUSEA: 0
CONSTIPATION: 0
ABDOMINAL PAIN: 0
DIARRHEA: 0
VOMITING: 0

## 2019-01-18 ENCOUNTER — OFFICE VISIT (OUTPATIENT)
Dept: FAMILY MEDICINE CLINIC | Age: 39
End: 2019-01-18
Payer: COMMERCIAL

## 2019-01-18 VITALS
BODY MASS INDEX: 33.45 KG/M2 | WEIGHT: 220 LBS | OXYGEN SATURATION: 99 % | RESPIRATION RATE: 16 BRPM | DIASTOLIC BLOOD PRESSURE: 70 MMHG | HEART RATE: 92 BPM | SYSTOLIC BLOOD PRESSURE: 124 MMHG

## 2019-01-18 DIAGNOSIS — M25.562 ACUTE PAIN OF LEFT KNEE: Primary | ICD-10-CM

## 2019-01-18 PROCEDURE — 99213 OFFICE O/P EST LOW 20 MIN: CPT | Performed by: FAMILY MEDICINE

## 2019-01-18 RX ORDER — MELOXICAM 15 MG/1
15 TABLET ORAL DAILY
Qty: 30 TABLET | Refills: 3 | Status: SHIPPED | OUTPATIENT
Start: 2019-01-18 | End: 2019-09-20

## 2019-01-18 ASSESSMENT — ENCOUNTER SYMPTOMS
GASTROINTESTINAL NEGATIVE: 1
RESPIRATORY NEGATIVE: 1
EYES NEGATIVE: 1

## 2019-01-21 ENCOUNTER — TELEPHONE (OUTPATIENT)
Dept: FAMILY MEDICINE CLINIC | Age: 39
End: 2019-01-21

## 2019-01-28 ENCOUNTER — TELEPHONE (OUTPATIENT)
Dept: FAMILY MEDICINE CLINIC | Age: 39
End: 2019-01-28

## 2019-05-28 ENCOUNTER — TELEPHONE (OUTPATIENT)
Dept: FAMILY MEDICINE CLINIC | Age: 39
End: 2019-05-28

## 2019-05-28 NOTE — TELEPHONE ENCOUNTER
[phone] (home)   LM H# per Dr Pablo Ness can not get work note with out [address]   Call office if wanting to schedule

## 2019-05-28 NOTE — TELEPHONE ENCOUNTER
Pt says he had a Gout flare-up over the holiday weekend. He did not go to work. He needs a work excuse to go back. He took his medication and flare-up. Pt was told he needs an appt. He has not been seen since January. He refused. He says he feels like this is a money racket. He feels fine now. He just needs a note for work.     LOV 1/18/19

## 2019-08-17 ENCOUNTER — HOSPITAL ENCOUNTER (EMERGENCY)
Age: 39
Discharge: HOME | End: 2019-08-17
Attending: EMERGENCY MEDICINE

## 2019-08-17 ENCOUNTER — APPOINTMENT (OUTPATIENT)
Dept: GENERAL RADIOLOGY | Age: 39
End: 2019-08-17

## 2019-08-17 VITALS
OXYGEN SATURATION: 98 % | HEIGHT: 68 IN | WEIGHT: 220 LBS | BODY MASS INDEX: 33.34 KG/M2 | TEMPERATURE: 98.4 F | DIASTOLIC BLOOD PRESSURE: 85 MMHG | HEART RATE: 79 BPM | SYSTOLIC BLOOD PRESSURE: 128 MMHG | RESPIRATION RATE: 18 BRPM

## 2019-08-17 DIAGNOSIS — R07.9 RIGHT-SIDED CHEST PAIN: ICD-10-CM

## 2019-08-17 DIAGNOSIS — R07.9 ACUTE CHEST PAIN: Primary | ICD-10-CM

## 2019-08-17 DIAGNOSIS — R42 DIZZINESS: ICD-10-CM

## 2019-08-17 LAB
A/G RATIO: 1.4 (ref 1.1–2.2)
ALBUMIN SERPL-MCNC: 4.7 G/DL (ref 3.4–5)
ALP BLD-CCNC: 59 U/L (ref 40–129)
ALT SERPL-CCNC: 28 U/L (ref 10–40)
ANION GAP SERPL CALCULATED.3IONS-SCNC: 13 MMOL/L (ref 3–16)
AST SERPL-CCNC: 24 U/L (ref 15–37)
BASOPHILS ABSOLUTE: 0 K/UL (ref 0–0.2)
BASOPHILS RELATIVE PERCENT: 0.6 %
BILIRUB SERPL-MCNC: 0.4 MG/DL (ref 0–1)
BUN BLDV-MCNC: 13 MG/DL (ref 7–20)
CALCIUM SERPL-MCNC: 9.6 MG/DL (ref 8.3–10.6)
CHLORIDE BLD-SCNC: 100 MMOL/L (ref 99–110)
CO2: 25 MMOL/L (ref 21–32)
CREAT SERPL-MCNC: 1.1 MG/DL (ref 0.9–1.3)
EOSINOPHILS ABSOLUTE: 0.1 K/UL (ref 0–0.6)
EOSINOPHILS RELATIVE PERCENT: 1.1 %
GFR AFRICAN AMERICAN: >60
GFR NON-AFRICAN AMERICAN: >60
GLOBULIN: 3.3 G/DL
GLUCOSE BLD-MCNC: 103 MG/DL (ref 70–99)
GLUCOSE BLD-MCNC: 107 MG/DL (ref 70–99)
HCT VFR BLD CALC: 45.7 % (ref 40.5–52.5)
HEMOGLOBIN: 15.4 G/DL (ref 13.5–17.5)
LYMPHOCYTES ABSOLUTE: 2.1 K/UL (ref 1–5.1)
LYMPHOCYTES RELATIVE PERCENT: 28.6 %
MAGNESIUM: 2 MG/DL (ref 1.8–2.4)
MCH RBC QN AUTO: 29.3 PG (ref 26–34)
MCHC RBC AUTO-ENTMCNC: 33.8 G/DL (ref 31–36)
MCV RBC AUTO: 86.6 FL (ref 80–100)
MONOCYTES ABSOLUTE: 0.4 K/UL (ref 0–1.3)
MONOCYTES RELATIVE PERCENT: 5.3 %
NEUTROPHILS ABSOLUTE: 4.8 K/UL (ref 1.7–7.7)
NEUTROPHILS RELATIVE PERCENT: 64.4 %
PDW BLD-RTO: 13.1 % (ref 12.4–15.4)
PERFORMED ON: ABNORMAL
PLATELET # BLD: 203 K/UL (ref 135–450)
PMV BLD AUTO: 7.9 FL (ref 5–10.5)
POTASSIUM SERPL-SCNC: 4.1 MMOL/L (ref 3.5–5.1)
PRO-BNP: <5 PG/ML (ref 0–124)
RBC # BLD: 5.27 M/UL (ref 4.2–5.9)
SODIUM BLD-SCNC: 138 MMOL/L (ref 136–145)
TOTAL PROTEIN: 8 G/DL (ref 6.4–8.2)
TROPONIN: <0.01 NG/ML
WBC # BLD: 7.5 K/UL (ref 4–11)

## 2019-08-17 PROCEDURE — 83880 ASSAY OF NATRIURETIC PEPTIDE: CPT

## 2019-08-17 PROCEDURE — 85025 COMPLETE CBC W/AUTO DIFF WBC: CPT

## 2019-08-17 PROCEDURE — 93005 ELECTROCARDIOGRAM TRACING: CPT | Performed by: EMERGENCY MEDICINE

## 2019-08-17 PROCEDURE — 84484 ASSAY OF TROPONIN QUANT: CPT

## 2019-08-17 PROCEDURE — 83735 ASSAY OF MAGNESIUM: CPT

## 2019-08-17 PROCEDURE — 99285 EMERGENCY DEPT VISIT HI MDM: CPT

## 2019-08-17 PROCEDURE — 71046 X-RAY EXAM CHEST 2 VIEWS: CPT

## 2019-08-17 PROCEDURE — 80053 COMPREHEN METABOLIC PANEL: CPT

## 2019-08-17 ASSESSMENT — ENCOUNTER SYMPTOMS
ABDOMINAL DISTENTION: 0
COLOR CHANGE: 0
WHEEZING: 0
BACK PAIN: 0
CONSTIPATION: 0
ABDOMINAL PAIN: 0
NAUSEA: 0
VOMITING: 0
COUGH: 0
DIARRHEA: 0
STRIDOR: 0
SHORTNESS OF BREATH: 0

## 2019-08-17 ASSESSMENT — PAIN SCALES - GENERAL: PAINLEVEL_OUTOF10: 3

## 2019-08-17 ASSESSMENT — HEART SCORE: ECG: 0

## 2019-08-17 ASSESSMENT — PAIN DESCRIPTION - PAIN TYPE: TYPE: ACUTE PAIN

## 2019-08-17 ASSESSMENT — PAIN DESCRIPTION - ORIENTATION: ORIENTATION: RIGHT

## 2019-08-17 ASSESSMENT — PAIN DESCRIPTION - LOCATION: LOCATION: CHEST

## 2019-08-17 NOTE — ED PROVIDER NOTES
I independently performed a history and physical on Isiah Saldaña. All diagnostic, treatment, and disposition decisions were made by myself in conjunction with the advanced practice provider. Briefly, this is a 46 y.o. male here for right-sided chest pain. The patient's pain is been going on since this morning. It is constant. He is also having lightheadedness. The lightheadedness has been present before today. It is intermittent, but he states it is better whenever he eats or drinks fluids. He has not passed out. He denies other symptoms. .    On exam, the patient appears well-hydrated, well-nourished, and in no acute distress. Mucous membranes are moist. Speech is clear. Breathing is unlabored. Skin is dry. Mental status is normal. The patient moves all extremities and is without facial droop. Heart is RRR. Lungs are CTAB. EKG  The Ekg interpreted by me in the absence of a cardiologist shows. normal sinus rhythm with a rate of 81  Axis is   Normal  QTc is  normal  Intervals and Durations are unremarkable. No specific ST-T wave changes appreciated. No evidence of acute ischemia. No previous EKGs available for comparison    Screenings     Heart Score for chest pain patients  History: Slightly Suspicious  ECG: Normal  Patient Age: < 45 years  *Risk factors for Atherosclerotic disease: Cigarette smoking  Risk Factors: 1 or 2 risk factors  Troponin: < 1X normal limit  Heart Score Total: 1    MDM  The patient meets all PERC criteria and has a low-risk Well's score, indicating very low risk of PE. The risks of further investigation, including a large dose of radiation and/or unnecessary treatment with anticoagulant therapy, outweigh the risk of a clinically significant PE. Thus, neither a D-dimer nor a CTA of the pulmonary arteries are indicated.     Since the patient's chest pain has been constant for greater than 3 hours, only one troponin is necessary to rule out myocardial infarction in the

## 2019-08-17 NOTE — ED PROVIDER NOTES
coordination. Normal rapid alternating hand movement. Normal heel to shin coordination   Laney Hallpike negative without nystagmus. 5 out of 5 strength in all 4 extremities without focal weakness, paresthesia or radiculopathy. Skin: Skin is warm and dry. Capillary refill takes less than 2 seconds. No rash noted. He is not diaphoretic. No erythema. No pallor. Psychiatric: He has a normal mood and affect. His behavior is normal.   Nursing note and vitals reviewed. DIAGNOSTIC RESULTS   LABS:    Labs Reviewed   COMPREHENSIVE METABOLIC PANEL - Abnormal; Notable for the following components:       Result Value    Glucose 103 (*)     All other components within normal limits    Narrative:     Performed at:  [facility] [address]   Phone [phone]   POCT GLUCOSE - Abnormal; Notable for the following components:    POC Glucose 107 (*)     All other components within normal limits    Narrative:     Performed at:  [facility] [address]   Phone [phone]   CBC WITH AUTO DIFFERENTIAL    Narrative:     Performed at:  [facility] [address]   Phone [phone]   TROPONIN    Narrative:     Performed at:  [facility] [address]   Phone [phone]     Narrative:     Performed at:  [facility] [address] [employer]   Phone [phone]   MAGNESIUM    Narrative:     Performed at:  [facility] [address]   Phone [phone]   POCT GLUCOSE       All other labs were within normal range or not returned as of this dictation. EKG:  All EKG's are interpreted by the Emergency Department Physician in the absence of a cardiologist. Please see their note for interpretation of EKG. RADIOLOGY:   Non-plain film images such as CT, Ultrasound and MRI are read by the radiologist. Plain radiographic images are visualized andpreliminarily interpreted by the  ED Provider with the below findings:        Interpretation [facility] Radiologist below, if available at the time of this note:    XR CHEST STANDARD (2 VW)   Final Result   No acute process. Xr Chest Standard (2 Vw)    Result Date: 8/17/2019  EXAMINATION: TWO XRAY VIEWS OF THE CHEST 8/17/2019 4:19 pm COMPARISON: 02/25/2017 HISTORY: ORDERING SYSTEM PROVIDED HISTORY: Chest Discomfort TECHNOLOGIST PROVIDED HISTORY: Reason for exam:->Chest Discomfort Reason for Exam: Chest Pain (right side intermittent CP and dizziness that started back in June. Pt denies SOB) Acuity: Unknown Type of Exam: Unknown FINDINGS: The lungs are without acute focal process. There is no effusion or pneumothorax. The cardiomediastinal silhouette is stable. The osseous structures are stable. No acute process. PROCEDURES   Unless otherwise noted below, none     Procedures    CRITICAL CARE TIME   N/A    CONSULTS:  None      EMERGENCY DEPARTMENT COURSE and DIFFERENTIAL DIAGNOSIS/MDM:   Vitals:    Vitals:    08/17/19 1556 08/17/19 1707   BP: (!) 161/97 128/85   Pulse: 82 79   Resp: 18 18   Temp: 98.4 °F (36.9 °C)    TempSrc: Infrared    SpO2: 98% 98%   Weight: 220 lb (99.8 kg)    Height: 5' 8\" (1.727 m)        Patient was given thefollowing medications:  Medications - No data to display  The patient presents to the emergency department complaining of right-sided chest pain and dizziness. When he went to get his chest x-ray, he belched and his chest pain went away completely. He denies any chest pain at this time. Abdomen soft and nontender. His heart score is a 1. He is low risk for PE.  EKG appears stable.   He is advised to follow-up with PCP referral for recheck and may return to ED per discharge instructions. My suspicion is low for pneumonia, cerebellar compromise, posterior stroke, otitis infection, sinusitis, Bell's palsy, trigeminal neuralgia, dental abscess, DKA, hypoglycemia, sepsis,carotid dissection, sinus abscess, acute fracture, acute CVA, ICH, SAH, TIA, meningitis, encephalitis, pseudotumor cerebri, temporal arteritis, sentinel bleed from ruptured aneurysm, hypertensive urgency or emergency, subdural hematoma, epidural hematoma, ACS, PE, myocarditis, pericarditis, endocarditis, acute pulmonary edema, pleural effusion, pericardial effusion, cardiac tamponade, cardiomyopathy, CHF exacerbation, thoracic aortic dissection, esophageal rupture, other life-threatening arrhythmia, hypertensive urgency or emergency, hemothorax, pulmonary contusion, subcutaneous emphysema, flail chest, pneumo mediastinum, rib fracture or other concerning pathology. We have addressed concerns and expectations. FINAL IMPRESSION      1. Acute chest pain    2. Right-sided chest pain    3.  Dizziness          DISPOSITION/PLAN   DISPOSITION Decision To Discharge 08/17/2019 04:59:57 PM      PATIENT REFERREDTO:  Heron Rubiomobud  [phone]          DISCHARGE MEDICATIONS:  Discharge Medication List as of 8/17/2019  5:08 PM          DISCONTINUED MEDICATIONS:  Discharge Medication List as of 8/17/2019  5:08 PM                 (Please note that portions ofthis note were completed with a voice recognition program.  Efforts were made to edit the dictations but occasionally words are mis-transcribed.)    Sarah Cortés PA-C (electronically signed)          Sarah Cortés PA-C  08/17/19 5902

## 2019-08-18 LAB
EKG ATRIAL RATE: 81 BPM
EKG DIAGNOSIS: NORMAL
EKG P AXIS: 18 DEGREES
EKG P-R INTERVAL: 156 MS
EKG Q-T INTERVAL: 384 MS
EKG QRS DURATION: 92 MS
EKG QTC CALCULATION (BAZETT): 446 MS
EKG R AXIS: 55 DEGREES
EKG T AXIS: 8 DEGREES
EKG VENTRICULAR RATE: 81 BPM

## 2019-08-18 PROCEDURE — 93010 ELECTROCARDIOGRAM REPORT: CPT | Performed by: INTERNAL MEDICINE

## 2019-09-04 ENCOUNTER — OFFICE VISIT (OUTPATIENT)
Dept: FAMILY MEDICINE CLINIC | Age: 39
End: 2019-09-04
Payer: COMMERCIAL

## 2019-09-04 VITALS
HEART RATE: 91 BPM | WEIGHT: 211 LBS | BODY MASS INDEX: 31.98 KG/M2 | RESPIRATION RATE: 16 BRPM | HEIGHT: 68 IN | DIASTOLIC BLOOD PRESSURE: 86 MMHG | TEMPERATURE: 98 F | SYSTOLIC BLOOD PRESSURE: 124 MMHG | OXYGEN SATURATION: 97 %

## 2019-09-04 DIAGNOSIS — M10.472 OTHER SECONDARY ACUTE GOUT OF LEFT FOOT: Primary | ICD-10-CM

## 2019-09-04 DIAGNOSIS — Z83.3 FHX: DIABETES MELLITUS: ICD-10-CM

## 2019-09-04 DIAGNOSIS — Z13.220 LIPID SCREENING: ICD-10-CM

## 2019-09-04 DIAGNOSIS — R73.09 ELEVATED HEMOGLOBIN A1C: ICD-10-CM

## 2019-09-04 DIAGNOSIS — R73.09 ELEVATED GLUCOSE: ICD-10-CM

## 2019-09-04 DIAGNOSIS — Z00.00 ROUTINE GENERAL MEDICAL EXAMINATION AT A HEALTH CARE FACILITY: ICD-10-CM

## 2019-09-04 PROCEDURE — 99203 OFFICE O/P NEW LOW 30 MIN: CPT | Performed by: FAMILY MEDICINE

## 2019-09-04 RX ORDER — INDOMETHACIN 50 MG/1
CAPSULE ORAL
Qty: 30 CAPSULE | Refills: 1 | Status: SHIPPED | OUTPATIENT
Start: 2019-09-04 | End: 2019-09-09 | Stop reason: ALTCHOICE

## 2019-09-04 ASSESSMENT — PATIENT HEALTH QUESTIONNAIRE - PHQ9
SUM OF ALL RESPONSES TO PHQ QUESTIONS 1-9: 0
1. LITTLE INTEREST OR PLEASURE IN DOING THINGS: 0
SUM OF ALL RESPONSES TO PHQ9 QUESTIONS 1 & 2: 0
2. FEELING DOWN, DEPRESSED OR HOPELESS: 0
SUM OF ALL RESPONSES TO PHQ QUESTIONS 1-9: 0

## 2019-09-04 NOTE — PATIENT INSTRUCTIONS
1) Get your labwork a few minutes before your next appointment. [facility]  [address] [place] [address], [address] [place] [address], [address]  Phone: [phone] Fax: [phone]  Mon.-Fri. 7 a.m.-5 p.m. Sat. 8 a.m.-Noon    [facility]  [address]  [facility] [address]  Phone: [phone]  Mon.-Fri. 7:30 a.m.-4 p.m. [facility]  [address] [place] [address], [facility] [address]  Phone: [phone]    Fax: [phone]  Monday-Friday 8:00 a.m.- 4:30 p.m. 14[address] [facility]  [address] 10 [facility], [address] [facility] [address]  Phone: [phone]  Mon.-Fri. 8 a.m.-8 p.m. Sat. 9 a.m.-5 p.m. [address]  [facility]. [address]  Phone: [phone] Fax: [phone]  Mon.-Fri. 8 a.m.-5 p.m. [facility]  [address] [place] [address]  Phone: [phone] Fax: [phone]  Mon.-Fri. 7:30 a.m.-4 p.m. [address], [facility] [address], [address] [place] [address]  Phone: [phone]  Mon., Tue., Thu., Fri. 7:30 a.m.-5 p.m.  [place]. 7:30 a.m.-[facility]  [address] [facility], [address] [facility]  Phone: [phone] Fax: [phone]  Mon.-Fri. 7:30 a.m.-4 p.m. [facility]  [address], [address]  Phone: [phone] Fax: [phone]  Mon.-Fri. 8 a.m.-4:30 p.m. [address] [facility]  [address], [address] [facility], [facility]0 [facility]  Phone: [phone] Fax: [phone]  Mon.-Fri. 8 a.m.-4:30 p.m. 13[address] [facility]  [address] [place] [address] [address]  Phone: [phone] Fax: [phone]  Mon.-Fri. 7 a.m.-5 p.m. Sat. 8 a.m.-4 p.m. [address]  Phone: [phone]  Mon.-Fri. 7:30 a.m.-4 p.m.     [address] [facility]  1139 [facility]  Phone: [phone] Fax: [phone]  Mon.-Fri. 7 a.m.-5 p.m. Sat. 8 a.m.-Noon [facility] [place], [address] [facility] [address]  Phone: [phone] Fax: [phone]  Mon.-Fri. 7 a.m.-5 p.m. [facility]  [address] [facility], 400 [facility]  Phone: [phone] Fax: [phone]  Mon.-Fri. 7 a.m.-5 p.m. [facility] [address]  Phone: [phone]  Mon.-Fri. 6:30 a.m.-6 p.m. Sat. 7 a.m.-1 p.m. [place] [address], [place], [facility]  Phone: [phone]  Open 24/7    [address], [address] [place] [address]  Phone: [phone]  Mon.-Fri. 6 a.m.-7 p.m. Sat. 7 a.m.-3 p.m. [facility]  [address] [facility]  Phone: [phone]  Mon.-Fri. 6 a.m.-11 p.m.  Sat.-Sun. 6:30 a.m.-11 p.m. [address]  [address] [facility], [facility], [facility]  Phone: [phone]  Mon.-Fri. 8 a.m.-4 p.m. [address] [facility]  [facility] [address]. [address]  Phone: [phone]  Open 24/7    [place] [address], [facility] [address]  Phone: [phone]  Mon.-Fri. 6:30 a.m.-6:30 p.m. Sat. 8 a.m.-Noon    [address] [facility]  [address]  Phone: [phone] Fax: [phone]  Mon.-Fri. 8:30 a.m.-5 p.m. [address], [address] [place] [address]  Phone: [phone] Fax: [phone]  Mon.-Fri. 8 a.m.-4:30 p.m. Please call ahead to check hours.

## 2019-09-04 NOTE — PROGRESS NOTES
[facility] Family Medicine  Progress Note  Carol Gould  1980    09/04/19    Chief Complaint:   Jose Zhou is a 46 y.o. male who is here to establish. HPI:   Right sided gout. Meloxicam is not helping. Has been decreasing the triggers. ROS negative for headache, vision changes, chest pain, shortness of breath, abdominal pain, urinary sx, bowel changes. Past medical, surgical, and social history reviewed. Medications and allergies reviewed. No Known Allergies  Prior to Visit Medications    Medication Sig Taking? Authorizing Provider   indomethacin (INDOCIN) 50 MG capsule Take with food TID during gout attacks no more than 5 days.  Yes Suresh Cabello DO   meloxicam (MOBIC) 15 MG tablet Take 1 tablet by mouth daily Yes Adan Salgado MD   ibuprofen (ADVIL;MOTRIN) 800 MG tablet Take 1 tablet by mouth every 8 hours as needed for Pain  Patient taking differently: Take 200 mg by mouth every 8 hours as needed for Pain  Yes JOANA Patel   indomethacin (INDOCIN) 50 MG capsule Take 1 capsule by mouth 2 times daily (with meals) for 20 doses  Clementine Vera MD          Vitals:    09/04/19 0814 09/04/19 0825 09/04/19 0900   BP: (!) 131/92 (!) 128/93 124/86   Pulse: 91     Resp: 16     Temp: 98 °F (36.7 °C)     TempSrc: Oral     SpO2: 97%     Weight: 211 lb (95.7 kg)     Height: 5' 8\" (1.727 m)        Wt Readings from Last 3 Encounters:   09/04/19 211 lb (95.7 kg)   08/17/19 220 lb (99.8 kg)   01/18/19 220 lb (99.8 kg)     BP Readings from Last 3 Encounters:   09/04/19 124/86   08/17/19 128/85   01/18/19 124/70       Patient Active Problem List   Diagnosis    Gout    Chronic back pain    Impingement syndrome, shoulder       Immunization History   Administered Date(s) Administered    Tdap (Boostrix, Adacel) 01/25/2017       Past Medical History:   Diagnosis Date    Gout     Influenza 02/25/2017     Past Surgical History:   Procedure Laterality Date    MAXIMUS [phone]  Mon.-Fri. 7:30 a.m.-4 p.m. [facility]  [address], Dariela Wagner  Phone: [phone]    Fax: [phone]  Monday-Friday 8:00 a.m.- 4:30 p.m. [address] and Urgent Care  [facility], [facility], [address] [facility] [address]  Phone: [phone]  Mon.-Fri. 8 a.m.-8 p.m. Sat. 9 a.m.-5 p.m. [address] [facility]  [address]  [facility]. [address]  Phone: [phone] Fax: [phone]  Mon.-Fri. 8 a.m.-5 p.m. [place]  [address] [place] [address]  Phone: [phone] Fax: [phone]  Mon.-Fri. 7:30 a.m.-4 p.m. [facility]  [facility], [address], [address]  Phone: [phone]  Mon., Tue. Thu., Fri. 7:30 a.m.-5 p.m.  [facility]. 7:30 a.m.[facility]  [address]  Phone: [phone] Fax: [phone]  Mon.-Fri. 7:30 a.m.-4 p.m. [facility]  [address], [address]  Phone: [phone] Fax: [phone]  Mon.-Fri. 8 a.m.-4:30 p.m. [address] [facility]  [facility], [facility], [facility]  Phone: [phone] Fax: [phone]  Mon.-Fri. 8 a.m.-4:30 p.m. [address]  and Urgent Care  [address] [facility]  [place], [address]  Phone: [phone] Fax: [phone]  Mon.-Fri. 7 a.m.-5 p.m. Sat. 8 a.m.-4 p.m. [address]  Phone: [phone]  Mon.-Fri. 7:30 a.m.-4 p.m. [facility]  [address] [facility]  Phone: [phone] Fax: [phone]  Mon.-Fri. 7 a.m.-5 p.m. Sat. 8 a.m.-Noon [facility] [place], [address] [place] [address]  Phone: [phone] Fax: [phone]  Mon.-Fri. 7 a.m.-5 p.m.     [employer]  [address]

## 2019-09-09 ENCOUNTER — TELEPHONE (OUTPATIENT)
Dept: FAMILY MEDICINE CLINIC | Age: 39
End: 2019-09-09

## 2019-09-09 RX ORDER — METHYLPREDNISOLONE 4 MG/1
TABLET ORAL
Qty: 21 TABLET | Refills: 0 | Status: SHIPPED | OUTPATIENT
Start: 2019-09-09 | End: 2019-09-15

## 2019-09-17 NOTE — TELEPHONE ENCOUNTER
Patient states that it is his 5th day taking the following medication: methylPREDNISolone (MEDROL DOSEPACK) 4 MG tablet. Patient feels as if it is not working. Mr Yasmani Griffin states it feels like its getting ready to swell up again. It hurts when he wear shoes. Mr Yasmani Griffin asked If he needed to come in for another appt. Please advise    He also wants to know if he can take ibuprofen over the counter.     [phone]

## 2019-09-20 ENCOUNTER — OFFICE VISIT (OUTPATIENT)
Dept: FAMILY MEDICINE CLINIC | Age: 39
End: 2019-09-20
Payer: COMMERCIAL

## 2019-09-20 VITALS
OXYGEN SATURATION: 99 % | TEMPERATURE: 98.6 F | HEART RATE: 98 BPM | DIASTOLIC BLOOD PRESSURE: 94 MMHG | SYSTOLIC BLOOD PRESSURE: 132 MMHG | BODY MASS INDEX: 32.1 KG/M2 | HEIGHT: 68 IN | WEIGHT: 211.8 LBS

## 2019-09-20 DIAGNOSIS — R03.0 PREHYPERTENSION: ICD-10-CM

## 2019-09-20 DIAGNOSIS — R42 DIZZINESS: Primary | ICD-10-CM

## 2019-09-20 DIAGNOSIS — M10.9 ACUTE GOUT OF RIGHT FOOT, UNSPECIFIED CAUSE: ICD-10-CM

## 2019-09-20 PROCEDURE — 99214 OFFICE O/P EST MOD 30 MIN: CPT | Performed by: FAMILY MEDICINE

## 2019-09-20 RX ORDER — FAMOTIDINE 40 MG/1
40 TABLET, FILM COATED ORAL
COMMUNITY
Start: 2019-09-19 | End: 2019-10-02 | Stop reason: ALTCHOICE

## 2019-09-20 NOTE — PROGRESS NOTES
[address] [place] [address] Drive Angie Pitts  Phone: [phone] Fax: [phone]  Mon.-Fri. 8:30 a.m.-5 p.m. [address]  Jeanette Bergman  Phone: [phone] Fax: [phone]  Mon.-Fri. 8 a.m.-4:30 p.m. Please call ahead to check hours. Please note a portion of this chart was generated using dragon dictation software. Although every effort was made to ensure the accuracy of this automated transcription, some errors in transcription may have occurred.

## 2019-09-28 ENCOUNTER — HOSPITAL ENCOUNTER (EMERGENCY)
Age: 39
Discharge: HOME | End: 2019-09-28
Attending: EMERGENCY MEDICINE
Payer: COMMERCIAL

## 2019-09-28 VITALS
TEMPERATURE: 98.6 F | HEIGHT: 68 IN | SYSTOLIC BLOOD PRESSURE: 130 MMHG | RESPIRATION RATE: 18 BRPM | HEART RATE: 72 BPM | OXYGEN SATURATION: 97 % | BODY MASS INDEX: 32.58 KG/M2 | WEIGHT: 215 LBS | DIASTOLIC BLOOD PRESSURE: 83 MMHG

## 2019-09-28 DIAGNOSIS — F41.1 ANXIETY STATE: ICD-10-CM

## 2019-09-28 DIAGNOSIS — M54.2 NECK PAIN: Primary | ICD-10-CM

## 2019-09-28 LAB
ANION GAP SERPL CALCULATED.3IONS-SCNC: 12 MMOL/L (ref 3–16)
BASOPHILS ABSOLUTE: 0.1 K/UL (ref 0–0.2)
BASOPHILS RELATIVE PERCENT: 0.9 %
BUN BLDV-MCNC: 12 MG/DL (ref 7–20)
CALCIUM SERPL-MCNC: 9.9 MG/DL (ref 8.3–10.6)
CHLORIDE BLD-SCNC: 103 MMOL/L (ref 99–110)
CO2: 25 MMOL/L (ref 21–32)
CREAT SERPL-MCNC: 1.1 MG/DL (ref 0.9–1.3)
EOSINOPHILS ABSOLUTE: 0.2 K/UL (ref 0–0.6)
EOSINOPHILS RELATIVE PERCENT: 2.5 %
GFR AFRICAN AMERICAN: >60
GFR NON-AFRICAN AMERICAN: >60
GLUCOSE BLD-MCNC: 112 MG/DL (ref 70–99)
HCT VFR BLD CALC: 47.7 % (ref 40.5–52.5)
HEMOGLOBIN: 16 G/DL (ref 13.5–17.5)
LYMPHOCYTES ABSOLUTE: 2.3 K/UL (ref 1–5.1)
LYMPHOCYTES RELATIVE PERCENT: 26.9 %
MCH RBC QN AUTO: 28.7 PG (ref 26–34)
MCHC RBC AUTO-ENTMCNC: 33.4 G/DL (ref 31–36)
MCV RBC AUTO: 85.8 FL (ref 80–100)
MONOCYTES ABSOLUTE: 0.5 K/UL (ref 0–1.3)
MONOCYTES RELATIVE PERCENT: 5.9 %
NEUTROPHILS ABSOLUTE: 5.6 K/UL (ref 1.7–7.7)
NEUTROPHILS RELATIVE PERCENT: 63.8 %
PDW BLD-RTO: 13.3 % (ref 12.4–15.4)
PLATELET # BLD: 218 K/UL (ref 135–450)
PMV BLD AUTO: 7.9 FL (ref 5–10.5)
POTASSIUM SERPL-SCNC: 4 MMOL/L (ref 3.5–5.1)
RBC # BLD: 5.56 M/UL (ref 4.2–5.9)
SODIUM BLD-SCNC: 140 MMOL/L (ref 136–145)
WBC # BLD: 8.7 K/UL (ref 4–11)

## 2019-09-28 PROCEDURE — 80048 BASIC METABOLIC PNL TOTAL CA: CPT

## 2019-09-28 PROCEDURE — 99283 EMERGENCY DEPT VISIT LOW MDM: CPT

## 2019-09-28 PROCEDURE — 85025 COMPLETE CBC W/AUTO DIFF WBC: CPT

## 2019-09-28 RX ORDER — METHOCARBAMOL 750 MG/1
750 TABLET, FILM COATED ORAL 3 TIMES DAILY PRN
Qty: 15 TABLET | Refills: 0 | Status: SHIPPED | OUTPATIENT
Start: 2019-09-28 | End: 2019-10-02 | Stop reason: ALTCHOICE

## 2019-09-28 ASSESSMENT — PAIN SCALES - GENERAL: PAINLEVEL_OUTOF10: 8

## 2019-09-30 ENCOUNTER — HOSPITAL ENCOUNTER (OUTPATIENT)
Age: 39
Discharge: HOME | End: 2019-09-30
Payer: COMMERCIAL

## 2019-09-30 DIAGNOSIS — M10.9 ACUTE GOUT OF RIGHT FOOT, UNSPECIFIED CAUSE: ICD-10-CM

## 2019-09-30 DIAGNOSIS — R03.0 PREHYPERTENSION: ICD-10-CM

## 2019-09-30 LAB
ANION GAP SERPL CALCULATED.3IONS-SCNC: 14 MMOL/L (ref 3–16)
BUN BLDV-MCNC: 10 MG/DL (ref 7–20)
CALCIUM SERPL-MCNC: 9.5 MG/DL (ref 8.3–10.6)
CHLORIDE BLD-SCNC: 102 MMOL/L (ref 99–110)
CO2: 26 MMOL/L (ref 21–32)
CREAT SERPL-MCNC: 1.1 MG/DL (ref 0.9–1.3)
GFR AFRICAN AMERICAN: >60
GFR NON-AFRICAN AMERICAN: >60
GLUCOSE BLD-MCNC: 98 MG/DL (ref 70–99)
POTASSIUM SERPL-SCNC: 4.3 MMOL/L (ref 3.5–5.1)
SODIUM BLD-SCNC: 142 MMOL/L (ref 136–145)
TROPONIN: <0.01 NG/ML
URIC ACID, SERUM: 9 MG/DL (ref 3.5–7.2)

## 2019-09-30 PROCEDURE — 36415 COLL VENOUS BLD VENIPUNCTURE: CPT

## 2019-09-30 PROCEDURE — 80048 BASIC METABOLIC PNL TOTAL CA: CPT

## 2019-09-30 PROCEDURE — 84484 ASSAY OF TROPONIN QUANT: CPT

## 2019-09-30 PROCEDURE — 84550 ASSAY OF BLOOD/URIC ACID: CPT

## 2019-10-01 ENCOUNTER — HOSPITAL ENCOUNTER (EMERGENCY)
Age: 39
Discharge: HOME | End: 2019-10-01
Attending: EMERGENCY MEDICINE
Payer: COMMERCIAL

## 2019-10-01 ENCOUNTER — HOSPITAL ENCOUNTER (OUTPATIENT)
Age: 39
Discharge: HOME | End: 2019-10-01
Payer: COMMERCIAL

## 2019-10-01 ENCOUNTER — APPOINTMENT (OUTPATIENT)
Dept: GENERAL RADIOLOGY | Age: 39
End: 2019-10-01
Payer: COMMERCIAL

## 2019-10-01 ENCOUNTER — TELEPHONE (OUTPATIENT)
Dept: FAMILY MEDICINE CLINIC | Age: 39
End: 2019-10-01

## 2019-10-01 VITALS
HEIGHT: 68 IN | RESPIRATION RATE: 20 BRPM | BODY MASS INDEX: 31.83 KG/M2 | WEIGHT: 210 LBS | OXYGEN SATURATION: 99 % | DIASTOLIC BLOOD PRESSURE: 89 MMHG | TEMPERATURE: 98.6 F | HEART RATE: 73 BPM | SYSTOLIC BLOOD PRESSURE: 131 MMHG

## 2019-10-01 DIAGNOSIS — R07.9 CHEST PAIN, UNSPECIFIED TYPE: Primary | ICD-10-CM

## 2019-10-01 DIAGNOSIS — M10.472 OTHER SECONDARY ACUTE GOUT OF LEFT FOOT: ICD-10-CM

## 2019-10-01 DIAGNOSIS — Z13.220 LIPID SCREENING: ICD-10-CM

## 2019-10-01 LAB
A/G RATIO: 1.3 (ref 1.1–2.2)
A/G RATIO: 1.4 (ref 1.1–2.2)
ALBUMIN SERPL-MCNC: 4.3 G/DL (ref 3.4–5)
ALBUMIN SERPL-MCNC: 4.5 G/DL (ref 3.4–5)
ALP BLD-CCNC: 61 U/L (ref 40–129)
ALP BLD-CCNC: 61 U/L (ref 40–129)
ALT SERPL-CCNC: 32 U/L (ref 10–40)
ALT SERPL-CCNC: 34 U/L (ref 10–40)
ANION GAP SERPL CALCULATED.3IONS-SCNC: 13 MMOL/L (ref 3–16)
ANION GAP SERPL CALCULATED.3IONS-SCNC: 9 MMOL/L (ref 3–16)
AST SERPL-CCNC: 22 U/L (ref 15–37)
AST SERPL-CCNC: 25 U/L (ref 15–37)
BASOPHILS ABSOLUTE: 0.1 K/UL (ref 0–0.2)
BASOPHILS RELATIVE PERCENT: 0.9 %
BILIRUB SERPL-MCNC: 0.3 MG/DL (ref 0–1)
BILIRUB SERPL-MCNC: 0.3 MG/DL (ref 0–1)
BILIRUBIN URINE: NEGATIVE
BLOOD, URINE: NEGATIVE
BUN BLDV-MCNC: 13 MG/DL (ref 7–20)
BUN BLDV-MCNC: 13 MG/DL (ref 7–20)
CALCIUM SERPL-MCNC: 9.2 MG/DL (ref 8.3–10.6)
CALCIUM SERPL-MCNC: 9.6 MG/DL (ref 8.3–10.6)
CHLORIDE BLD-SCNC: 100 MMOL/L (ref 99–110)
CHLORIDE BLD-SCNC: 104 MMOL/L (ref 99–110)
CHOLESTEROL, TOTAL: 175 MG/DL (ref 0–199)
CLARITY: CLEAR
CO2: 26 MMOL/L (ref 21–32)
CO2: 27 MMOL/L (ref 21–32)
COLOR: YELLOW
CREAT SERPL-MCNC: 1 MG/DL (ref 0.9–1.3)
CREAT SERPL-MCNC: 1 MG/DL (ref 0.9–1.3)
D DIMER: <200 NG/ML DDU (ref 0–229)
EKG ATRIAL RATE: 81 BPM
EKG DIAGNOSIS: NORMAL
EKG P AXIS: 34 DEGREES
EKG P-R INTERVAL: 164 MS
EKG Q-T INTERVAL: 376 MS
EKG QRS DURATION: 94 MS
EKG QTC CALCULATION (BAZETT): 436 MS
EKG R AXIS: 44 DEGREES
EKG T AXIS: 13 DEGREES
EKG VENTRICULAR RATE: 81 BPM
EOSINOPHILS ABSOLUTE: 0.2 K/UL (ref 0–0.6)
EOSINOPHILS RELATIVE PERCENT: 2.3 %
GFR AFRICAN AMERICAN: >60
GFR AFRICAN AMERICAN: >60
GFR NON-AFRICAN AMERICAN: >60
GFR NON-AFRICAN AMERICAN: >60
GLOBULIN: 3.2 G/DL
GLOBULIN: 3.3 G/DL
GLUCOSE BLD-MCNC: 108 MG/DL (ref 70–99)
GLUCOSE BLD-MCNC: 116 MG/DL (ref 70–99)
GLUCOSE URINE: NEGATIVE MG/DL
HCT VFR BLD CALC: 45.6 % (ref 40.5–52.5)
HDLC SERPL-MCNC: 48 MG/DL (ref 40–60)
HEMOGLOBIN: 15.3 G/DL (ref 13.5–17.5)
INR BLD: 0.9 (ref 0.86–1.14)
KETONES, URINE: NEGATIVE MG/DL
LDL CHOLESTEROL CALCULATED: 89 MG/DL
LEUKOCYTE ESTERASE, URINE: NEGATIVE
LIPASE: 46 U/L (ref 13–60)
LYMPHOCYTES ABSOLUTE: 1.8 K/UL (ref 1–5.1)
LYMPHOCYTES RELATIVE PERCENT: 28.4 %
MCH RBC QN AUTO: 29 PG (ref 26–34)
MCHC RBC AUTO-ENTMCNC: 33.5 G/DL (ref 31–36)
MCV RBC AUTO: 86.4 FL (ref 80–100)
MICROSCOPIC EXAMINATION: NORMAL
MONOCYTES ABSOLUTE: 0.3 K/UL (ref 0–1.3)
MONOCYTES RELATIVE PERCENT: 5.1 %
NEUTROPHILS ABSOLUTE: 4.1 K/UL (ref 1.7–7.7)
NEUTROPHILS RELATIVE PERCENT: 63.3 %
NITRITE, URINE: NEGATIVE
PDW BLD-RTO: 13.3 % (ref 12.4–15.4)
PH UA: 7 (ref 5–8)
PLATELET # BLD: 196 K/UL (ref 135–450)
PMV BLD AUTO: 8.1 FL (ref 5–10.5)
POTASSIUM REFLEX MAGNESIUM: 4.3 MMOL/L (ref 3.5–5.1)
POTASSIUM SERPL-SCNC: 4.3 MMOL/L (ref 3.5–5.1)
PRO-BNP: 14 PG/ML (ref 0–124)
PROTEIN UA: NEGATIVE MG/DL
PROTHROMBIN TIME: 10.3 SEC (ref 9.8–13)
RBC # BLD: 5.28 M/UL (ref 4.2–5.9)
SODIUM BLD-SCNC: 139 MMOL/L (ref 136–145)
SODIUM BLD-SCNC: 140 MMOL/L (ref 136–145)
SPECIFIC GRAVITY UA: <1.005 (ref 1–1.03)
TOTAL PROTEIN: 7.5 G/DL (ref 6.4–8.2)
TOTAL PROTEIN: 7.8 G/DL (ref 6.4–8.2)
TRIGL SERPL-MCNC: 190 MG/DL (ref 0–150)
TROPONIN: <0.01 NG/ML
URIC ACID, SERUM: 9.2 MG/DL (ref 3.5–7.2)
URINE REFLEX TO CULTURE: NORMAL
URINE TYPE: NORMAL
UROBILINOGEN, URINE: 0.2 E.U./DL
VLDLC SERPL CALC-MCNC: 38 MG/DL
WBC # BLD: 6.5 K/UL (ref 4–11)

## 2019-10-01 PROCEDURE — 99285 EMERGENCY DEPT VISIT HI MDM: CPT

## 2019-10-01 PROCEDURE — 93005 ELECTROCARDIOGRAM TRACING: CPT | Performed by: EMERGENCY MEDICINE

## 2019-10-01 PROCEDURE — 84484 ASSAY OF TROPONIN QUANT: CPT

## 2019-10-01 PROCEDURE — 36415 COLL VENOUS BLD VENIPUNCTURE: CPT

## 2019-10-01 PROCEDURE — 80053 COMPREHEN METABOLIC PANEL: CPT

## 2019-10-01 PROCEDURE — 81003 URINALYSIS AUTO W/O SCOPE: CPT

## 2019-10-01 PROCEDURE — 85610 PROTHROMBIN TIME: CPT

## 2019-10-01 PROCEDURE — 80061 LIPID PANEL: CPT

## 2019-10-01 PROCEDURE — 93010 ELECTROCARDIOGRAM REPORT: CPT | Performed by: INTERNAL MEDICINE

## 2019-10-01 PROCEDURE — 85025 COMPLETE CBC W/AUTO DIFF WBC: CPT

## 2019-10-01 PROCEDURE — 71045 X-RAY EXAM CHEST 1 VIEW: CPT

## 2019-10-01 PROCEDURE — 85379 FIBRIN DEGRADATION QUANT: CPT

## 2019-10-01 PROCEDURE — 83880 ASSAY OF NATRIURETIC PEPTIDE: CPT

## 2019-10-01 PROCEDURE — 83690 ASSAY OF LIPASE: CPT

## 2019-10-01 PROCEDURE — 84550 ASSAY OF BLOOD/URIC ACID: CPT

## 2019-10-01 ASSESSMENT — ENCOUNTER SYMPTOMS
BACK PAIN: 1
NAUSEA: 0
CHEST TIGHTNESS: 0
SHORTNESS OF BREATH: 0
VOMITING: 0
SORE THROAT: 0
ABDOMINAL PAIN: 0
DIARRHEA: 0

## 2019-10-02 ENCOUNTER — OFFICE VISIT (OUTPATIENT)
Dept: FAMILY MEDICINE CLINIC | Age: 39
End: 2019-10-02
Payer: COMMERCIAL

## 2019-10-02 ENCOUNTER — TELEPHONE (OUTPATIENT)
Dept: BARIATRICS/WEIGHT MGMT | Age: 39
End: 2019-10-02

## 2019-10-02 VITALS
DIASTOLIC BLOOD PRESSURE: 89 MMHG | OXYGEN SATURATION: 97 % | RESPIRATION RATE: 14 BRPM | HEART RATE: 72 BPM | WEIGHT: 210.6 LBS | BODY MASS INDEX: 32.02 KG/M2 | TEMPERATURE: 97.2 F | SYSTOLIC BLOOD PRESSURE: 125 MMHG

## 2019-10-02 DIAGNOSIS — R03.0 PREHYPERTENSION: ICD-10-CM

## 2019-10-02 DIAGNOSIS — R10.10 UPPER ABDOMINAL PAIN OF UNKNOWN ETIOLOGY: Primary | ICD-10-CM

## 2019-10-02 DIAGNOSIS — R07.89 CHEST PRESSURE: ICD-10-CM

## 2019-10-02 PROCEDURE — 99214 OFFICE O/P EST MOD 30 MIN: CPT | Performed by: FAMILY MEDICINE

## 2019-10-02 PROCEDURE — 1111F DSCHRG MED/CURRENT MED MERGE: CPT | Performed by: FAMILY MEDICINE

## 2019-10-02 RX ORDER — RANITIDINE 150 MG/1
150 TABLET ORAL 2 TIMES DAILY
Qty: 28 TABLET | Refills: 0 | Status: SHIPPED | OUTPATIENT
Start: 2019-10-02 | End: 2019-10-03 | Stop reason: SINTOL

## 2019-10-03 ENCOUNTER — TELEPHONE (OUTPATIENT)
Dept: FAMILY MEDICINE CLINIC | Age: 39
End: 2019-10-03

## 2019-10-03 RX ORDER — HYDROXYZINE HYDROCHLORIDE 25 MG/1
25 TABLET, FILM COATED ORAL EVERY 8 HOURS PRN
Qty: 15 TABLET | Refills: 0 | Status: SHIPPED | OUTPATIENT
Start: 2019-10-03 | End: 2019-10-08

## 2019-10-03 NOTE — TELEPHONE ENCOUNTER
Try the Atarax. It is prescribed to the pharmacy. Take the Atarax when he experiences chest symptoms or any anxiety as it is to help with both. Stop Zantac for now. Ask patient to Call tomorrow or Monday with update how he is feeling.

## 2019-10-03 NOTE — TELEPHONE ENCOUNTER
Spoke with pt. Last seen 10. 2.19  has previous ER visits  Continues to experience dizziness and nausea that comes and goes daily, emesis while on phone with pt. States he feels weird. Sx of tingling hands and sweaty hands and feet. Continues to have pressure on rt side of middle chest. Has appt  10.14.19 with Dr. Moe Winslow. Advised pt if sx continue or become worse to go to ER.   Does pcp advise anything else for pt to do  Thank you

## 2019-10-08 ENCOUNTER — TELEPHONE (OUTPATIENT)
Dept: SURGERY | Age: 39
End: 2019-10-08

## 2019-10-11 ENCOUNTER — TELEPHONE (OUTPATIENT)
Dept: BARIATRICS/WEIGHT MGMT | Age: 39
End: 2019-10-11

## 2019-10-14 ENCOUNTER — TELEPHONE (OUTPATIENT)
Dept: SURGERY | Age: 39
End: 2019-10-14

## 2019-10-14 NOTE — TELEPHONE ENCOUNTER
Patient is requesting a return call regarding insurance denial for surgery scheduled today 10/14 @ 1pm Spontaneous, unlabored and symmetrical

## 2019-10-15 ENCOUNTER — TELEPHONE (OUTPATIENT)
Dept: BARIATRICS/WEIGHT MGMT | Age: 39
End: 2019-10-15

## 2019-10-24 ENCOUNTER — TELEPHONE (OUTPATIENT)
Dept: BARIATRICS/WEIGHT MGMT | Age: 39
End: 2019-10-24

## 2019-10-28 ENCOUNTER — TELEPHONE (OUTPATIENT)
Dept: FAMILY MEDICINE CLINIC | Age: 39
End: 2019-10-28

## 2019-10-29 ENCOUNTER — TELEPHONE (OUTPATIENT)
Dept: BARIATRICS/WEIGHT MGMT | Age: 39
End: 2019-10-29

## 2019-10-30 ENCOUNTER — HOSPITAL ENCOUNTER (OUTPATIENT)
Age: 39
Setting detail: OUTPATIENT SURGERY
Discharge: HOME | End: 2019-10-30
Attending: SURGERY | Admitting: SURGERY
Payer: COMMERCIAL

## 2019-10-30 ENCOUNTER — ANESTHESIA EVENT (OUTPATIENT)
Dept: ENDOSCOPY | Age: 39
End: 2019-10-30

## 2019-10-30 ENCOUNTER — ANESTHESIA (OUTPATIENT)
Dept: ENDOSCOPY | Age: 39
End: 2019-10-30

## 2019-10-30 VITALS
SYSTOLIC BLOOD PRESSURE: 139 MMHG | DIASTOLIC BLOOD PRESSURE: 96 MMHG | RESPIRATION RATE: 17 BRPM | OXYGEN SATURATION: 95 %

## 2019-10-30 VITALS
TEMPERATURE: 97.7 F | BODY MASS INDEX: 32.96 KG/M2 | HEART RATE: 68 BPM | RESPIRATION RATE: 20 BRPM | SYSTOLIC BLOOD PRESSURE: 119 MMHG | HEIGHT: 67 IN | OXYGEN SATURATION: 97 % | WEIGHT: 210 LBS | DIASTOLIC BLOOD PRESSURE: 84 MMHG

## 2019-10-30 PROCEDURE — 3609012400 HC EGD TRANSORAL BIOPSY SINGLE/MULTIPLE: Performed by: SURGERY

## 2019-10-30 PROCEDURE — 3700000001 HC ADD 15 MINUTES (ANESTHESIA): Performed by: SURGERY

## 2019-10-30 PROCEDURE — 88342 IMHCHEM/IMCYTCHM 1ST ANTB: CPT

## 2019-10-30 PROCEDURE — 43239 EGD BIOPSY SINGLE/MULTIPLE: CPT | Performed by: SURGERY

## 2019-10-30 PROCEDURE — 3700000000 HC ANESTHESIA ATTENDED CARE: Performed by: SURGERY

## 2019-10-30 PROCEDURE — 7100000010 HC PHASE II RECOVERY - FIRST 15 MIN: Performed by: SURGERY

## 2019-10-30 PROCEDURE — 7100000011 HC PHASE II RECOVERY - ADDTL 15 MIN: Performed by: SURGERY

## 2019-10-30 PROCEDURE — 88305 TISSUE EXAM BY PATHOLOGIST: CPT

## 2019-10-30 PROCEDURE — 2709999900 HC NON-CHARGEABLE SUPPLY: Performed by: SURGERY

## 2019-10-30 PROCEDURE — 6360000002 HC RX W HCPCS: Performed by: NURSE ANESTHETIST, CERTIFIED REGISTERED

## 2019-10-30 RX ORDER — FAMOTIDINE 40 MG/1
40 TABLET, FILM COATED ORAL
COMMUNITY
Start: 2019-09-19 | End: 2019-11-01

## 2019-10-30 RX ORDER — LIDOCAINE HYDROCHLORIDE 20 MG/ML
INJECTION, SOLUTION INTRAVENOUS PRN
Status: DISCONTINUED | OUTPATIENT
Start: 2019-10-30 | End: 2019-10-30 | Stop reason: SDUPTHER

## 2019-10-30 RX ORDER — OMEPRAZOLE 40 MG/1
40 CAPSULE, DELAYED RELEASE ORAL DAILY
Qty: 30 CAPSULE | Refills: 3 | Status: SHIPPED | OUTPATIENT
Start: 2019-10-30 | End: 2020-08-14

## 2019-10-30 RX ORDER — PROPOFOL 10 MG/ML
INJECTION, EMULSION INTRAVENOUS PRN
Status: DISCONTINUED | OUTPATIENT
Start: 2019-10-30 | End: 2019-10-30 | Stop reason: SDUPTHER

## 2019-10-30 RX ADMIN — PROPOFOL 50 MG: 10 INJECTION, EMULSION INTRAVENOUS at 07:56

## 2019-10-30 RX ADMIN — PROPOFOL 50 MG: 10 INJECTION, EMULSION INTRAVENOUS at 07:53

## 2019-10-30 RX ADMIN — LIDOCAINE HYDROCHLORIDE 100 MG: 20 INJECTION, SOLUTION INTRAVENOUS at 07:50

## 2019-10-30 RX ADMIN — PROPOFOL 100 MG: 10 INJECTION, EMULSION INTRAVENOUS at 07:50

## 2019-10-30 ASSESSMENT — PULMONARY FUNCTION TESTS
PIF_VALUE: 0

## 2019-10-30 ASSESSMENT — PAIN - FUNCTIONAL ASSESSMENT
PAIN_FUNCTIONAL_ASSESSMENT: FACES
PAIN_FUNCTIONAL_ASSESSMENT: 0-10
PAIN_FUNCTIONAL_ASSESSMENT: FACES

## 2019-11-01 ENCOUNTER — OFFICE VISIT (OUTPATIENT)
Dept: FAMILY MEDICINE CLINIC | Age: 39
End: 2019-11-01

## 2019-11-01 VITALS
BODY MASS INDEX: 34.06 KG/M2 | TEMPERATURE: 97.6 F | DIASTOLIC BLOOD PRESSURE: 84 MMHG | HEART RATE: 82 BPM | HEIGHT: 67 IN | OXYGEN SATURATION: 97 % | RESPIRATION RATE: 10 BRPM | WEIGHT: 217 LBS | SYSTOLIC BLOOD PRESSURE: 120 MMHG

## 2019-11-01 DIAGNOSIS — R10.13 EPIGASTRIC PAIN: Primary | ICD-10-CM

## 2019-11-01 PROCEDURE — 99213 OFFICE O/P EST LOW 20 MIN: CPT | Performed by: FAMILY MEDICINE

## 2019-11-04 RX ORDER — CLARITHROMYCIN 500 MG/1
500 TABLET, COATED ORAL 2 TIMES DAILY
Qty: 28 TABLET | Refills: 0 | Status: SHIPPED | OUTPATIENT
Start: 2019-11-04 | End: 2020-09-24 | Stop reason: SDUPTHER

## 2019-11-04 RX ORDER — AMOXICILLIN 500 MG/1
1000 CAPSULE ORAL 2 TIMES DAILY
Qty: 56 CAPSULE | Refills: 0 | Status: SHIPPED | OUTPATIENT
Start: 2019-11-04 | End: 2020-09-24 | Stop reason: SDUPTHER

## 2019-11-11 ENCOUNTER — TELEPHONE (OUTPATIENT)
Dept: BARIATRICS/WEIGHT MGMT | Age: 39
End: 2019-11-11

## 2019-11-14 ENCOUNTER — TELEPHONE (OUTPATIENT)
Dept: ADMINISTRATIVE | Age: 39
End: 2019-11-14

## 2019-12-17 DIAGNOSIS — A04.8 H. PYLORI INFECTION: Primary | ICD-10-CM

## 2019-12-27 ENCOUNTER — TELEPHONE (OUTPATIENT)
Dept: FAMILY MEDICINE CLINIC | Age: 39
End: 2019-12-27

## 2020-01-16 ENCOUNTER — TELEPHONE (OUTPATIENT)
Dept: FAMILY MEDICINE CLINIC | Age: 40
End: 2020-01-16

## 2020-02-06 ENCOUNTER — NURSE TRIAGE (OUTPATIENT)
Dept: OTHER | Facility: CLINIC | Age: 40
End: 2020-02-06

## 2020-02-06 ENCOUNTER — OFFICE VISIT (OUTPATIENT)
Dept: FAMILY MEDICINE CLINIC | Age: 40
End: 2020-02-06

## 2020-02-06 VITALS
RESPIRATION RATE: 18 BRPM | HEART RATE: 101 BPM | DIASTOLIC BLOOD PRESSURE: 84 MMHG | OXYGEN SATURATION: 96 % | SYSTOLIC BLOOD PRESSURE: 110 MMHG

## 2020-02-06 DIAGNOSIS — R42 DIZZINESS: ICD-10-CM

## 2020-02-06 DIAGNOSIS — R53.83 FATIGUE, UNSPECIFIED TYPE: ICD-10-CM

## 2020-02-06 LAB
A/G RATIO: 1.4 (ref 1.1–2.2)
ALBUMIN SERPL-MCNC: 4.8 G/DL (ref 3.4–5)
ALP BLD-CCNC: 74 U/L (ref 40–129)
ALT SERPL-CCNC: 28 U/L (ref 10–40)
ANION GAP SERPL CALCULATED.3IONS-SCNC: 16 MMOL/L (ref 3–16)
AST SERPL-CCNC: 23 U/L (ref 15–37)
BILIRUB SERPL-MCNC: 0.9 MG/DL (ref 0–1)
BUN BLDV-MCNC: 11 MG/DL (ref 7–20)
CALCIUM SERPL-MCNC: 10.1 MG/DL (ref 8.3–10.6)
CHLORIDE BLD-SCNC: 96 MMOL/L (ref 99–110)
CO2: 25 MMOL/L (ref 21–32)
CREAT SERPL-MCNC: 1.1 MG/DL (ref 0.9–1.3)
GFR AFRICAN AMERICAN: >60
GFR NON-AFRICAN AMERICAN: >60
GLOBULIN: 3.5 G/DL
GLUCOSE BLD-MCNC: 109 MG/DL (ref 70–99)
HCT VFR BLD CALC: 50.7 % (ref 40.5–52.5)
HEMOGLOBIN: 17 G/DL (ref 13.5–17.5)
MCH RBC QN AUTO: 28.7 PG (ref 26–34)
MCHC RBC AUTO-ENTMCNC: 33.6 G/DL (ref 31–36)
MCV RBC AUTO: 85.4 FL (ref 80–100)
PDW BLD-RTO: 13.5 % (ref 12.4–15.4)
PLATELET # BLD: 234 K/UL (ref 135–450)
PMV BLD AUTO: 8.3 FL (ref 5–10.5)
POTASSIUM SERPL-SCNC: 4.3 MMOL/L (ref 3.5–5.1)
RBC # BLD: 5.93 M/UL (ref 4.2–5.9)
SODIUM BLD-SCNC: 137 MMOL/L (ref 136–145)
TOTAL PROTEIN: 8.3 G/DL (ref 6.4–8.2)
TSH SERPL DL<=0.05 MIU/L-ACNC: 1.78 UIU/ML (ref 0.27–4.2)
VITAMIN D 25-HYDROXY: 10.9 NG/ML
WBC # BLD: 7.8 K/UL (ref 4–11)

## 2020-02-06 PROCEDURE — 99214 OFFICE O/P EST MOD 30 MIN: CPT | Performed by: FAMILY MEDICINE

## 2020-02-06 ASSESSMENT — PATIENT HEALTH QUESTIONNAIRE - PHQ9
1. LITTLE INTEREST OR PLEASURE IN DOING THINGS: 0
SUM OF ALL RESPONSES TO PHQ QUESTIONS 1-9: 0
SUM OF ALL RESPONSES TO PHQ9 QUESTIONS 1 & 2: 0
2. FEELING DOWN, DEPRESSED OR HOPELESS: 0
SUM OF ALL RESPONSES TO PHQ QUESTIONS 1-9: 0

## 2020-02-06 NOTE — PROGRESS NOTES
Patient is here for dizziness. No vertigo. He has decreased alcohol - only consumed on New Years Kamryn and Super Bowl in 2020 so far. He used to drink a lot on the weekends. Belching and indigestion. He had flare yesterday after drinking Meg's ice tea. Last dizziness was yesterday. He drinks water, Gatorade, Sprite and iced tea. No dysphagia. He is snoring . He is fatigued. He used to be active with [employer] job and switched jobs and became less active . He gained weight over past 3-4 years. He used to weigh 180 lbs, but has gained weight. Not exercising. Some left sided chest pain . He chews on mint gum and seems to help when he has chest pain or indigestion. No shortness of breath . No nasal congestion or post nasal drip . Head position changes flare the dizziness. No regular numbness / tingling to arms/ hands. No palpitations. His chest pain is not exertional.     Review of Systems    ROS: All other systems were reviewed and are negative . Patient's allergies and medications were reviewed. Patient's past medical, surgical, social , and family history were reviewed. Lying BP =  132/80    , P =  84    Sitting BP =120/80      , P =   84  OBJECTIVE:  /84   Pulse 101   Resp 18   SpO2 96%     Physical Exam    General: NAD, cooperative, alert and oriented X 3. Mood / affect is good. good insight. well hydrated. HEENT: PERRLA, EOMI, TMs - clear. Nasopharynx clear. No dizziness with head position changes. Neck : no lymphadenopathy, supple, FROM  CV: Regular rate and rhythm , no murmurs/ rub/ gallop. No edema. Lungs : CTA bilaterally, breathing comfortably  Abdomen: positive bowel sounds, soft ,epigastric tenderness. No rebound/ guarding, non distended. No hepatosplenomegaly. No CVA tenderness. Skin: no rashes. Non tender. ASSESSMENT/  PLAN:  1. Dizziness  - asymptomatic today. - Comprehensive Metabolic Panel;  CBC; Future  - TSH without Reflex;  Future  - advised to keep log of flares, including activity and diet. 2. Gastroesophageal reflux disease without esophagitis  - recommended dietary/ lifestyle modifications, including decreased alcohol. Handout was given. - Omeprazole ER 40 mg qd.     3. Fatigue, unspecified type  - CBC;  TSH without Reflex; Future  - Vitamin D 25 Hydroxy; Future  - Weight loss recommended. Discussed dietary/ lifestyle modifications, including cardiovascular exercise > 150 minutes/ week. - Hold on sleep study , but discussed if no improvement. Follow up 4  weeks/ prn.

## 2020-02-06 NOTE — TELEPHONE ENCOUNTER
Reason for Disposition   Patient wants to be seen    Protocols used: DIZZINESS-ADULT-[place]    Received call from Afsaneh in [facility]. Patient has hx GERD. He has been having more issues. He also has been having episodes of dizziness, then he gets anxiety. He is getting worried about and it's affecting his life. He denies dizziness right now. He is not having chest pain. He had an iced tea yesterday and started with a lot of burping. And continued to burp for a long time. Call soft transferred to Aleks Franco PCP office to schedule appointment.

## 2020-02-06 NOTE — PATIENT INSTRUCTIONS
Smoking can make GERD worse. If you need help quitting, talk to your doctor about stop-smoking programs and medicines. These can increase your chances of quitting for good. · If you have GERD symptoms at night, raise the head of your bed 6 to 8 inches by putting the frame on blocks or placing a foam wedge under the head of your mattress. (Adding extra pillows does not work.)  · Do not wear tight clothing around your middle. · Lose weight if you need to. Losing just 5 to 10 pounds can help. When should you call for help? Call your doctor now or seek immediate medical care if:    · You have new or different belly pain.     · Your stools are black and tarlike or have streaks of blood.    Watch closely for changes in your health, and be sure to contact your doctor if:    · Your symptoms have not improved after 2 days.     · Food seems to catch in your throat or chest.   Where can you learn more? Go to https://Percello.Fundamo (Proprietary). org and sign in to your [employer] account. Enter Y593 in the YouScribe box to learn more about \"Gastroesophageal Reflux Disease (GERD): Care Instructions. \"     If you do not have an account, please click on the \"Sign Up Now\" link. Current as of: August 11, 2019  Content Version: 12.3  © 4314-9814 Healthwise, Incorporated. Care instructions adapted under license by [facility]"[employer] [facility] ([facility]). If you have questions about a medical condition or this instruction, always ask your healthcare professional. [place] [address] any warranty or liability for your use of this information.

## 2020-02-07 DIAGNOSIS — R73.09 ELEVATED GLUCOSE: ICD-10-CM

## 2020-02-07 PROBLEM — E55.9 VITAMIN D DEFICIENCY: Status: ACTIVE | Noted: 2020-02-01

## 2020-02-07 LAB
ESTIMATED AVERAGE GLUCOSE: 119.8 MG/DL
HBA1C MFR BLD: 5.8 %

## 2020-02-07 RX ORDER — ERGOCALCIFEROL 1.25 MG/1
50000 CAPSULE ORAL WEEKLY
Qty: 13 CAPSULE | Refills: 2 | Status: SHIPPED | OUTPATIENT
Start: 2020-02-07 | End: 2020-08-14 | Stop reason: ALTCHOICE

## 2020-02-09 PROBLEM — R73.03 PREDIABETES: Status: ACTIVE | Noted: 2020-02-01

## 2020-02-13 ENCOUNTER — TELEPHONE (OUTPATIENT)
Dept: FAMILY MEDICINE CLINIC | Age: 40
End: 2020-02-13

## 2020-02-13 ENCOUNTER — NURSE TRIAGE (OUTPATIENT)
Dept: OTHER | Facility: CLINIC | Age: 40
End: 2020-02-13

## 2020-02-13 NOTE — TELEPHONE ENCOUNTER
Reason for Disposition   Health Information question, no triage required and triager able to answer question    Protocols used: INFORMATION ONLY CALL-ADULT-AH    Received call from Troy in [facility]. Patient calling with c/o of continued dizziness. The patient has been seen by his PCP prior to this call on 2/6 for the same complaint and has sent a telephone message to the PCP. While the writer was describing his symptoms to this writer the patient was called back by the PCP. The writer encouraged the patient to talk to the PCP office about the dizziness and the call was disconnected with this writer. No triage was performed.

## 2020-02-20 ENCOUNTER — OFFICE VISIT (OUTPATIENT)
Dept: FAMILY MEDICINE CLINIC | Age: 40
End: 2020-02-20

## 2020-02-20 VITALS
DIASTOLIC BLOOD PRESSURE: 90 MMHG | RESPIRATION RATE: 16 BRPM | SYSTOLIC BLOOD PRESSURE: 122 MMHG | WEIGHT: 215.5 LBS | TEMPERATURE: 97.5 F | OXYGEN SATURATION: 98 % | HEART RATE: 90 BPM | BODY MASS INDEX: 33.75 KG/M2

## 2020-02-20 PROCEDURE — 99213 OFFICE O/P EST LOW 20 MIN: CPT | Performed by: FAMILY MEDICINE

## 2020-02-20 RX ORDER — MECLIZINE HCL 12.5 MG/1
12.5-25 TABLET ORAL 3 TIMES DAILY PRN
Qty: 30 TABLET | Refills: 0 | Status: SHIPPED | OUTPATIENT
Start: 2020-02-20 | End: 2020-03-01

## 2020-05-26 RX ORDER — INDOMETHACIN 50 MG/1
CAPSULE ORAL
Qty: 30 CAPSULE | Refills: 1 | Status: SHIPPED | OUTPATIENT
Start: 2020-05-26 | End: 2020-05-28 | Stop reason: SDUPTHER

## 2020-05-28 RX ORDER — INDOMETHACIN 50 MG/1
CAPSULE ORAL
Qty: 30 CAPSULE | Refills: 1 | Status: SHIPPED | OUTPATIENT
Start: 2020-05-28 | End: 2020-08-14 | Stop reason: ALTCHOICE

## 2020-08-14 ENCOUNTER — VIRTUAL VISIT (OUTPATIENT)
Dept: FAMILY MEDICINE CLINIC | Age: 40
End: 2020-08-14
Payer: MEDICAID

## 2020-08-14 PROCEDURE — G2012 BRIEF CHECK IN BY MD/QHP: HCPCS | Performed by: FAMILY MEDICINE

## 2020-08-14 RX ORDER — PANTOPRAZOLE SODIUM 40 MG/1
40 TABLET, DELAYED RELEASE ORAL
Qty: 90 TABLET | Refills: 1 | Status: SHIPPED | OUTPATIENT
Start: 2020-08-14 | End: 2020-09-25 | Stop reason: ALTCHOICE

## 2020-08-14 NOTE — PATIENT INSTRUCTIONS
Switch omeprazole to Protonix. Take protonix 30 min before first meal of the day. If need Tums, do not take it within 1 to 2 hours or protonix as that can affect how well the omeprazole works.

## 2020-08-14 NOTE — PROGRESS NOTES
Seema Marroquin is a 45 y.o. male evaluated via telephone on 8/14/2020. Consent:  He and/or health care decision maker is aware that that he may receive a bill for this telephone service, depending on his insurance coverage, and has provided verbal consent to proceed: Yes      Documentation:  I communicated with the patient and/or health care decision maker about dx/tx. Details of this discussion including any medical advice provided: see below. GERD continues. No worsening with physical activity. Worsens with food especially lemon. I affirm this is a Patient Initiated Episode with a Patient who has not had a related appointment within my department in the past 7 days or scheduled within the next 24 hours. Patient identification was verified at the start of the visit: Yes    Total Time: minutes: 5-10 minutes     Diagnosis Orders   1.  Gastroesophageal reflux disease without esophagitis  pantoprazole (PROTONIX) 40 MG tablet       Note: not billable if this call serves to triage the patient into an appointment for the relevant concern      Erlinda Edwards

## 2020-08-25 ENCOUNTER — TELEPHONE (OUTPATIENT)
Dept: FAMILY MEDICINE CLINIC | Age: 40
End: 2020-08-25

## 2020-08-25 DIAGNOSIS — R07.89 CHEST PRESSURE: Primary | ICD-10-CM

## 2020-08-25 NOTE — TELEPHONE ENCOUNTER
----- Message from Tanesha Hare sent at 8/25/2020  8:56 AM EDT -----  Subject: Message to Provider    QUESTIONS  Information for Provider? Patient calling stating he would like to take Dr Marcelo Colunga up on seeing a Cardiologist (Per patient this was suggested to   him at last visit with Dr Marcelo Colunga)  ---------------------------------------------------------------------------  --------------  [address]  What is the best way for the office to contact you? OK to leave message on   voicemail  Preferred Call Back Phone Number? [phone]  ---------------------------------------------------------------------------  --------------  SCRIPT ANSWERS  Relationship to Patient?  Self

## 2020-08-26 NOTE — TELEPHONE ENCOUNTER
Referral is sent. Call or send my chart message to patient to give him information to schedule appointment. [address], Franchesca Fallon MD   [address]  [address] [facility] , [address] [place] [address]   [phone]

## 2020-08-31 ENCOUNTER — OFFICE VISIT (OUTPATIENT)
Dept: FAMILY MEDICINE CLINIC | Age: 40
End: 2020-08-31
Payer: MEDICAID

## 2020-08-31 VITALS
HEIGHT: 68 IN | WEIGHT: 215 LBS | BODY MASS INDEX: 32.58 KG/M2 | TEMPERATURE: 97.8 F | SYSTOLIC BLOOD PRESSURE: 122 MMHG | OXYGEN SATURATION: 97 % | RESPIRATION RATE: 12 BRPM | HEART RATE: 92 BPM | DIASTOLIC BLOOD PRESSURE: 74 MMHG

## 2020-08-31 PROCEDURE — 4004F PT TOBACCO SCREEN RCVD TLK: CPT | Performed by: FAMILY MEDICINE

## 2020-08-31 PROCEDURE — G8427 DOCREV CUR MEDS BY ELIG CLIN: HCPCS | Performed by: FAMILY MEDICINE

## 2020-08-31 PROCEDURE — G8417 CALC BMI ABV UP PARAM F/U: HCPCS | Performed by: FAMILY MEDICINE

## 2020-08-31 PROCEDURE — 99214 OFFICE O/P EST MOD 30 MIN: CPT | Performed by: FAMILY MEDICINE

## 2020-08-31 RX ORDER — LEVOFLOXACIN 500 MG/1
500 TABLET, FILM COATED ORAL DAILY
Qty: 7 TABLET | Refills: 0 | Status: SHIPPED | OUTPATIENT
Start: 2020-08-31 | End: 2020-09-07

## 2020-08-31 NOTE — PROGRESS NOTES
[facility] Family Medicine  Progress Note  Zabrina Garcia  1980    09/07/20    Chief Complaint:   Joanette Ganser is a 45 y.o. male who is here for epigastric pain        HPI:   GERD and left sided chest pain resolved with PPI. When takes break, it is worse. Headache is better when GERD is ocontrolled. Denies any vomiting    ROS negative for , visionchanges, shortness of breath,  urinary sx, bowel changes. Past medical, surgical, and social history reviewed. and allergies reviewed. No Known Allergies  Prior to Visit Medications    Medication Sig Taking?  Authorizing Provider   levoFLOXacin (LEVAQUIN) 500 MG tablet Take 1 tablet by mouth daily for 7 days Yes Cristian Gilliam DO   pantoprazole (PROTONIX) 40 MG tablet Take 1 tablet by mouth every morning (before breakfast) Yes Oswaldo Cabello DO          Vitals:    08/31/20 1043 08/31/20 1106   BP: (!) 138/94 122/74   Pulse: 92    Resp: 12    Temp: 97.8 °F (36.6 °C)    TempSrc: Temporal    SpO2: 97%    Weight: 215 lb (97.5 kg)    Height: 5' 8\" (1.727 m)       Wt Readings from Last 3 Encounters:   08/31/20 215 lb (97.5 kg)   02/20/20 215 lb 8 oz (97.8 kg)   11/01/19 217 lb (98.4 kg)     BP Readings from Last 3 Encounters:   08/31/20 122/74   02/20/20 (!) 122/90   02/06/20 110/84       Patient Active Problem List   Diagnosis    Gout    Chronic back pain    Impingement syndrome, shoulder    Vitamin D deficiency    Elevated glucose    Prediabetes       Immunization History   Administered Date(s) Administered    Tdap (Boostrix, Adacel) 01/25/2017       Past Medical History:   Diagnosis Date    GERD (gastroesophageal reflux disease)     Gout     Influenza 02/25/2017    Prediabetes 02/2020    Vitamin D deficiency 02/2020     Past Surgical History:   Procedure Laterality Date    DENTAL SURGERY      root canal / cracked tooth    OTHER SURGICAL HISTORY      left shoulder - bone spurs    UPPER GASTROINTESTINAL ENDOSCOPY N/A 10/30/2019    EGD BIOPSY performed by Lopez Green DO at [facility] EXTRACTION      age late 25s, in [place]     Family History   Problem Relation Age of Onset   Avtar Osman Alcohol Abuse Father     Stroke Mother     Other Brother         gout    Diabetes Maternal Uncle      Social History     Socioeconomic History    Marital status: Single     Spouse name: Not on file    Number of children: 0    Years of education: Not on file    Highest education level: Not on file   Occupational History    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 states he stopped a couple of days ago   Substance and Sexual Activity    Alcohol use: Not Currently     Comment: binge drinker on weekends    Drug use: Not Currently    Sexual activity: Not on file   Lifestyle    Physical activity     Days per week: Not on file     Minutes per session: Not on file    Stress: Not on file   Relationships    Social connections     Talks on phone: Not on file     Gets together: Not on file     Attends Yarsani service: Not on file     Active member of club or organization: Not on file     Attends meetings of clubs or organizations: Not on file     Relationship status: Not on file    Intimate partner violence     Fear of current or ex partner: Not on file     Emotionally abused: Not on file     Physically abused: Not on file     Forced sexual activity: Not on file   Other Topics Concern    Not on file   Social History Narrative    Not on file       O: /74   Pulse 92   Temp 97.8 °F (36.6 °C)

## 2020-08-31 NOTE — PATIENT INSTRUCTIONS
--If the left sided scrotal pain continues, you can take daily antibiotic pill for 7 days. If still not resolved, will proceed with ultrasound    --Call [phone] to schedule the barium swallow.

## 2020-09-05 ENCOUNTER — HOSPITAL ENCOUNTER (OUTPATIENT)
Dept: GENERAL RADIOLOGY | Age: 40
Discharge: HOME | End: 2020-09-05
Payer: MEDICAID

## 2020-09-05 PROCEDURE — 74220 X-RAY XM ESOPHAGUS 1CNTRST: CPT

## 2020-09-09 ENCOUNTER — TELEPHONE (OUTPATIENT)
Dept: FAMILY MEDICINE CLINIC | Age: 40
End: 2020-09-09

## 2020-09-09 DIAGNOSIS — R07.89 CHEST PRESSURE: Primary | ICD-10-CM

## 2020-09-09 NOTE — TELEPHONE ENCOUNTER
Pt returned call for imaging results FL ESOPHAGRAM   Informed pt of results. States he has VV this Fri with pcp and will discuss further. Pt states he would like a heart stress test ordered per a discussion with pcp AB previously  Would like ordered as soon as possible.     Please advise  Thank you

## 2020-09-10 NOTE — TELEPHONE ENCOUNTER
Patient should be able to perform a treadmill stress test.    Order is in the system. He should check his insurance plan so he knows his co-pay before proceeding. Give him scheduling phone number to get this done this month.     Keep telehealth appointment

## 2020-09-18 ENCOUNTER — VIRTUAL VISIT (OUTPATIENT)
Dept: FAMILY MEDICINE CLINIC | Age: 40
End: 2020-09-18
Payer: MEDICAID

## 2020-09-18 PROCEDURE — G8427 DOCREV CUR MEDS BY ELIG CLIN: HCPCS | Performed by: FAMILY MEDICINE

## 2020-09-18 PROCEDURE — 99213 OFFICE O/P EST LOW 20 MIN: CPT | Performed by: FAMILY MEDICINE

## 2020-09-18 NOTE — PATIENT INSTRUCTIONS
[address]  Phone: [phone] Fax: [phone]  Mon.-Fri. 7 a.m.-5 p.m. [address]  [facility] [address]  Phone: [phone]  Mon.-Fri. 6:30 a.m.-6 p.m. Sat. 7 a.m.-1 p.m. [facility], [place], [facility]  Phone: [phone]  Open 24/7    [address], [address] [facility]  Phone: [phone]  Mon.-Fri. 6 a.m.-7 p.m. Sat. 7 a.m.-3 p.m. [facility]  [address] [facility]  Phone: [phone]  Mon.-Fri. 6 a.m.-11 p.m.  Sat.-Sun. 6:30 a.m.-11 p.m. Rehana Yanez and [facility] [place]  [address] [place] [address]  Phone: [phone]  Mon.-Fri. 9 a.m.- 1 p.m. [address] [place] [address]. [address]  Phone: [phone]  Open 24/7    [facility] [address], [place] [address]  Phone: [phone]  Mon.-Fri. 6:30 a.m.-6:30 p.m. Sat. 8 a.m.-Noon    [address] [facility]  Phone: [phone] Fax: [phone]  Mon.-Fri. 8:30 a.m.-5 p.m. [facility]  [address] [address]  Phone: [phone] Fax: [phone]  Mon.-Fri. 8 a.m.-4:30 p.m. [address], [facility] Jackie Drewe  Phone: [phone]  Fax: [phone]  Mon-Fri 7:30 am 4 p.m. Please call ahead to check hours.

## 2020-09-19 ENCOUNTER — HOSPITAL ENCOUNTER (OUTPATIENT)
Age: 40
Discharge: HOME | End: 2020-09-19
Payer: MEDICAID

## 2020-09-19 LAB
A/G RATIO: 1.5 (ref 1.1–2.2)
ALBUMIN SERPL-MCNC: 4.5 G/DL (ref 3.4–5)
ALP BLD-CCNC: 61 U/L (ref 40–129)
ALT SERPL-CCNC: 37 U/L (ref 10–40)
ANION GAP SERPL CALCULATED.3IONS-SCNC: 11 MMOL/L (ref 3–16)
AST SERPL-CCNC: 26 U/L (ref 15–37)
BILIRUB SERPL-MCNC: 0.4 MG/DL (ref 0–1)
BUN BLDV-MCNC: 14 MG/DL (ref 7–20)
CALCIUM SERPL-MCNC: 9.5 MG/DL (ref 8.3–10.6)
CHLORIDE BLD-SCNC: 101 MMOL/L (ref 99–110)
CO2: 26 MMOL/L (ref 21–32)
CREAT SERPL-MCNC: 1.1 MG/DL (ref 0.9–1.3)
GFR AFRICAN AMERICAN: >60
GFR NON-AFRICAN AMERICAN: >60
GLOBULIN: 3.1 G/DL
GLUCOSE BLD-MCNC: 109 MG/DL (ref 70–99)
LIPASE: 51 U/L (ref 13–60)
POTASSIUM SERPL-SCNC: 4.5 MMOL/L (ref 3.5–5.1)
SODIUM BLD-SCNC: 138 MMOL/L (ref 136–145)
TOTAL PROTEIN: 7.6 G/DL (ref 6.4–8.2)

## 2020-09-19 PROCEDURE — 83036 HEMOGLOBIN GLYCOSYLATED A1C: CPT

## 2020-09-19 PROCEDURE — 83013 H PYLORI (C-13) BREATH: CPT

## 2020-09-19 PROCEDURE — 83690 ASSAY OF LIPASE: CPT

## 2020-09-19 PROCEDURE — 80053 COMPREHEN METABOLIC PANEL: CPT

## 2020-09-19 PROCEDURE — 36415 COLL VENOUS BLD VENIPUNCTURE: CPT

## 2020-09-20 LAB
ESTIMATED AVERAGE GLUCOSE: 119.8 MG/DL
HBA1C MFR BLD: 5.8 %

## 2020-09-21 ENCOUNTER — HOSPITAL ENCOUNTER (OUTPATIENT)
Dept: NON INVASIVE DIAGNOSTICS | Age: 40
Discharge: HOME | End: 2020-09-21
Payer: MEDICAID

## 2020-09-21 LAB — H PYLORI BREATH TEST: POSITIVE

## 2020-09-21 PROCEDURE — 93017 CV STRESS TEST TRACING ONLY: CPT

## 2020-09-24 RX ORDER — OMEPRAZOLE 40 MG/1
40 CAPSULE, DELAYED RELEASE ORAL DAILY
Qty: 30 CAPSULE | Refills: 3 | Status: SHIPPED | OUTPATIENT
Start: 2020-09-24 | End: 2020-11-16

## 2020-09-24 RX ORDER — CLARITHROMYCIN 500 MG/1
500 TABLET, COATED ORAL 2 TIMES DAILY
Qty: 28 TABLET | Refills: 0 | Status: SHIPPED | OUTPATIENT
Start: 2020-09-24 | End: 2020-10-07

## 2020-09-24 RX ORDER — AMOXICILLIN 500 MG/1
1000 CAPSULE ORAL 2 TIMES DAILY
Qty: 56 CAPSULE | Refills: 0 | Status: SHIPPED | OUTPATIENT
Start: 2020-09-24 | End: 2020-10-08

## 2020-09-24 NOTE — TELEPHONE ENCOUNTER
Please go over the EMR. This was prescribed back in November and went to [facility] prescribed by Dr. Jermaine Brown. I resent the 3 prescription combination again. It is 2 abx and omeprazole. Go over the directions with him. I realize it is a lot of pills for about 2 weeks, but if successful, this should take care of the symptoms.

## 2020-09-25 ENCOUNTER — TELEMEDICINE (OUTPATIENT)
Dept: FAMILY MEDICINE CLINIC | Age: 40
End: 2020-09-25
Payer: MEDICAID

## 2020-09-25 PROCEDURE — 99214 OFFICE O/P EST MOD 30 MIN: CPT | Performed by: FAMILY MEDICINE

## 2020-09-25 PROCEDURE — G8427 DOCREV CUR MEDS BY ELIG CLIN: HCPCS | Performed by: FAMILY MEDICINE

## 2020-09-25 PROCEDURE — 4004F PT TOBACCO SCREEN RCVD TLK: CPT | Performed by: FAMILY MEDICINE

## 2020-09-25 PROCEDURE — G8417 CALC BMI ABV UP PARAM F/U: HCPCS | Performed by: FAMILY MEDICINE

## 2020-09-25 NOTE — PROGRESS NOTES
[facility]  TELEMEDICINE Progress Note  Estella Carter DO      The risks and benefits of converting to a virtual visit were discussed in light of the current infectious disease epidemic. Patient also understood that insurance coverage and co-pays are up to their individual insurance plans. Patient identification was verified at the start of the visit. Bernhard Buerger  1980    09/25/20    Patient location: Home address on file  Provider location: Physician's home    Chief Complaint:   Bernhard Buerger is a 45 y.o. patient who is here for headache and dizziness. 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 Feels like his life is going to be turned off. He is not sure if thisi s panic attacks. Freaked out he said during early part of the pandemic        ROS negative for headache, vision changes, chest pain, shortness of breath, abdominal pain, urinary sx, bowel changes. Past medical, surgical, and social history reviewed. Medications and allergies reviewed. No Known Allergies  Prior to Visit Medications    Medication Sig Taking? Authorizing Provider   omeprazole (PRILOSEC) 40 MG delayed release capsule Take 1 capsule by mouth daily Yes Fernie Cabello, DO   clarithromycin (BIAXIN) 500 MG tablet Take 1 tablet by mouth 2 times daily for 14 days Yes Fernie Cabello, DO   amoxicillin (AMOXIL) 500 MG capsule Take 2 capsules by mouth 2 times daily for 14 days Yes Adebayo Mention, DO          Patient-Reported Vitals 9/25/2020   Patient-Reported Weight 212   Patient-Reported Height 5'8\"      There were no vitals filed for this visit.    Wt Readings from Last 3 Encounters:   08/31/20 215 lb (97.5 kg)   02/20/20 215 lb 8 oz (97.8 kg)   11/01/19 217 lb (98.4 kg)     BP Readings from Last 3 Encounters:   08/31/20 122/74   02/20/20 (!) 122/90   02/06/20 110/84       Patient Active Problem List   Diagnosis    Gout    Chronic back pain    Impingement syndrome, shoulder    Vitamin D deficiency    Elevated glucose    Prediabetes       Immunization History   Administered Date(s) Administered    Tdap (Boostrix, Adacel) 01/25/2017       Past Medical History:   Diagnosis Date    GERD (gastroesophageal reflux disease)     Gout     Influenza 02/25/2017    Prediabetes 02/2020    Vitamin D deficiency 02/2020     Past Surgical History:   Procedure Laterality Date    DENTAL SURGERY      root canal / cracked tooth    OTHER SURGICAL HISTORY      left shoulder - bone spurs    UPPER GASTROINTESTINAL ENDOSCOPY N/A 10/30/2019    EGD BIOPSY performed by Latosha Gould DO at 1 [facility] EXTRACTION      age late 25s, in [facility]     Family History   Problem Relation Age of Onset    Alcohol Abuse Father     Stroke Mother     Other Brother         gout    Diabetes Maternal Uncle      Social History     Socioeconomic History    Marital status: Single     Spouse name: Not on file    Number of children: 0    Years of education: Not on file    Highest education level: Not on file   Occupational History    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 states he stopped a couple of days ago   Substance and Sexual Activity    Alcohol use: Not Currently     Comment: binge drinker on weekends    Drug use: Not Currently    Sexual activity: Not on file   Lifestyle    Physical activity     Days per week: Not on file     Minutes per session: Not on file    Stress: Not on file   Relationships    Social connections Talks on phone: Not on file     Gets together: Not on file     Attends Anabaptism service: Not on file     Active member of club or organization: Not on file     Attends meetings of clubs or organizations: Not on file     Relationship status: Not on file    Intimate partner violence     Fear of current or ex partner: Not on file     Emotionally abused: Not on file     Physically abused: Not on file     Forced sexual activity: Not on file   Other Topics Concern    Not on file   Social History Narrative    Not on file       O: There were no vitals taken for this visit. Physical Exam  PHYSICAL EXAMINATION:  [ INSTRUCTIONS:  \"[x]\" Indicates a positive item  \"[]\" Indicates a negative item  -- DELETE ALL ITEMS NOT EXAMINED]  Vital Signs: (As obtained by patient/caregiver or practitioner observation)    Constitutional: [x] Appears well-developed and well-nourished [x] No apparent distress      [] Abnormal-   Mental status  [x] Alert and awake  [x] Oriented to person/place/time [x]Able to follow commands      Eyes:  EOM    [x]  Normal  [] Abnormal-  Sclera  [x]  Normal  [] Abnormal -         Discharge [x]  None visible  [] Abnormal -    HENT:   [x] Normocephalic, atraumatic.   [] Abnormal   [] Mouth/Throat: Mucous membranes are moist.     External Ears [x] Normal  [] Abnormal-     Neck: [x] No visualized mass     Pulmonary/Chest: [x] Respiratory effort normal.  [x] No visualized signs of difficulty breathing or respiratory distress        [] Abnormal-      Musculoskeletal:   [] Normal gait with no signs of ataxia         [x] Normal range of motion of neck        [] Abnormal-       Neurological:        [x] No Facial Asymmetry (Cranial nerve 7 motor function) (limited exam to video visit)          [x] No gaze palsy        [] Abnormal-         Skin:        [x] No significant exanthematous lesions or discoloration noted on facial skin         [] Abnormal-            Psychiatric:       [x] Normal Affect [] No Hallucinations [] Abnormal-     Other pertinent observable physical exam findings- n/a    Due to this being a TeleHealth encounter, evaluation of the following organ systems is limited: Vitals/Constitutional/EENT/Resp/CV/GI//MS/Neuro/Skin/Heme-Lymph-Imm. ASSESSMENT   Diagnosis Orders   1. Headache syndrome  MRI BRAIN W WO CONTRAST   2. Panic     3. Imbalance  MRI BRAIN W WO CONTRAST     #1 and #3: possibly due to #2. R/o pathology with MRI. PLAN          If applicable, see additional patient information and instructions under \"Patient Instructions. \"    Return if symptoms worsen or fail to improve. Patient Instructions   --Complete the 2 week course of all 3 medications. --Call [facility] to complete the MRI test. It is with contrast.        TOTAL TIME (in minutes) SPENT ON CONFERENCIN    Please note a portion of this chart was generated using dragon dictation software. Although every effort was made to ensure the accuracy of this automated transcription, some errors in transcription may have occurred. Pursuant to the emergency declaration under the [facility], [facility], [facility] waiver authority and the [employer] Act, this Virtual  Visit was conducted, with patient's consent, to reduce the patient's risk of exposure to COVID-19 and provide continuity of care for an established patient. Services were provided through a video synchronous discussion virtually to substitute for in-person clinic visit. Patient was instructed that the AVS is available on My Chart or was emailed to the patient if not on My Chart. Lab orders were emailed to patient if they do not use a [facility] lab. Any work notes were sent to patient through My Chart or email.

## 2020-09-25 NOTE — PATIENT INSTRUCTIONS
--Complete the 2 week course of all 3 medications.     --Call [facility] to complete the MRI test. It is with contrast.

## 2020-09-29 NOTE — TELEPHONE ENCOUNTER
Patient states he has not started taking the antibiotics. He remembers having these medications in the past and they caused him very bad chest pains. Also, patient states he was told by Tim that a PA will be needed for the MRI prior to him scheduling it. Patient states Tim told him our office needed to submit a PA + medical office notes showing the reason for the MRI. Patient can be reached at # [phone].

## 2020-10-01 NOTE — TELEPHONE ENCOUNTER
L/m for pt that he will need to call [facility] and have them fax us the proper forms needed for PA. L/m fax # also.

## 2020-10-05 ENCOUNTER — TELEPHONE (OUTPATIENT)
Dept: FAMILY MEDICINE CLINIC | Age: 40
End: 2020-10-05

## 2020-10-05 DIAGNOSIS — A04.8 H. PYLORI INFECTION: Primary | ICD-10-CM

## 2020-10-05 NOTE — TELEPHONE ENCOUNTER
Pt. Tried both of the antibiotic once but states it hurt his chest. Felt like heartburn so he stopped taking them. Pt. Is asking if there are any other options?

## 2020-10-07 RX ORDER — AMOXICILLIN 500 MG/1
1000 CAPSULE ORAL 2 TIMES DAILY
Qty: 42 CAPSULE | Refills: 0 | Status: SHIPPED | OUTPATIENT
Start: 2020-10-07 | End: 2020-10-21

## 2020-10-07 RX ORDER — LEVOFLOXACIN 500 MG/1
500 TABLET, FILM COATED ORAL DAILY
Qty: 14 TABLET | Refills: 0 | Status: SHIPPED | OUTPATIENT
Start: 2020-10-07 | End: 2020-10-21

## 2020-10-07 RX ORDER — OMEPRAZOLE 20 MG/1
20 CAPSULE, DELAYED RELEASE ORAL
Qty: 28 CAPSULE | Refills: 0 | Status: SHIPPED | OUTPATIENT
Start: 2020-10-07 | End: 2020-11-03

## 2020-10-07 NOTE — TELEPHONE ENCOUNTER
I think this 14 day regimen will be more tolerable to Finesse Rae:    Omeprazole 20 mg before the first meal of the day and just before evening meal.    Amoxicillin 500 mg. Take 2 capsules also in the morning and again before the evening meal.    Levaquin 500 mg. Take 1 tablet daily. He can choose whether it is in the morning or before dinner. 3 prescriptions are E scribed to his pharmacy.

## 2020-10-09 ENCOUNTER — TELEMEDICINE (OUTPATIENT)
Dept: FAMILY MEDICINE CLINIC | Age: 40
End: 2020-10-09
Payer: MEDICAID

## 2020-10-09 PROCEDURE — G8484 FLU IMMUNIZE NO ADMIN: HCPCS | Performed by: FAMILY MEDICINE

## 2020-10-09 PROCEDURE — G8427 DOCREV CUR MEDS BY ELIG CLIN: HCPCS | Performed by: FAMILY MEDICINE

## 2020-10-09 PROCEDURE — G8417 CALC BMI ABV UP PARAM F/U: HCPCS | Performed by: FAMILY MEDICINE

## 2020-10-09 PROCEDURE — 4004F PT TOBACCO SCREEN RCVD TLK: CPT | Performed by: FAMILY MEDICINE

## 2020-10-09 PROCEDURE — 99213 OFFICE O/P EST LOW 20 MIN: CPT | Performed by: FAMILY MEDICINE

## 2020-10-09 NOTE — PROGRESS NOTES
[facility]  TELEMEDICINE Progress Note  Dorie Diop DO      The risks and benefits of converting to a virtual visit were discussed in light of the current infectious disease epidemic. Patient also understood that insurance coverage and co-pays are up to their individual insurance plans. Patient identification was verified at the start of the visit. Anil Craft  1980    10/09/20    Patient location: Home address on file  Provider location: Physician's home    Chief Complaint:   Anil Craft is a 45 y.o. patient who is here for f/u on chest pain. HPI:     Medications he is taking is causing chest pain. He is interested in taking IV medication but I am not aware of this. Amoxicillin to take with food. Pantoprazole can be taken with food. Clarithromycin. He is dipping tobacco during the telehealth. ROS negative for headache, vision changes, chest pain, shortness of breath, abdominal pain, urinary sx, bowel changes. Past medical, surgical, and social history reviewed. Medications and allergies reviewed. No Known Allergies  Prior to Visit Medications    Medication Sig Taking?  Authorizing Provider   nicotine polacrilex (NICORETTE) 2 MG gum Take 1 each by mouth as needed for Smoking cessation Yes [facility] DO Destiney   levoFLOXacin (LEVAQUIN) 500 MG tablet Take 1 tablet by mouth daily for 14 days  Patient not taking: Reported on 10/9/2020  [facility] Destiney DO   amoxicillin (AMOXIL) 500 MG capsule Take 2 capsules by mouth 2 times daily for 14 days  Patient not taking: Reported on 10/9/2020  [facility] Destiney DO   omeprazole (PRILOSEC) 20 MG delayed release capsule Take 1 capsule by mouth 2 times daily (before meals)  Patient not taking: Reported on 10/9/2020  [facility] Destiney DO   omeprazole (PRILOSEC) 40 MG delayed release capsule Take 1 capsule by mouth daily  Patient not taking: Reported on 10/9/2020  Clarisse Loaiza DO Patient-Reported Vitals 10/9/2020   Patient-Reported Weight 210   Patient-Reported Height 5'8\"      There were no vitals filed for this visit.    Wt Readings from Last 3 Encounters:   08/31/20 215 lb (97.5 kg)   02/20/20 215 lb 8 oz (97.8 kg)   11/01/19 217 lb (98.4 kg)     BP Readings from Last 3 Encounters:   08/31/20 122/74   02/20/20 (!) 122/90   02/06/20 110/84       Patient Active Problem List   Diagnosis    Gout    Chronic back pain    Impingement syndrome, shoulder    Vitamin D deficiency    Elevated glucose    Prediabetes       Immunization History   Administered Date(s) Administered    Tdap (Boostrix, Adacel) 01/25/2017       Past Medical History:   Diagnosis Date    GERD (gastroesophageal reflux disease)     Gout     Influenza 02/25/2017    Prediabetes 02/2020    Vitamin D deficiency 02/2020     Past Surgical History:   Procedure Laterality Date    DENTAL SURGERY      root canal / cracked tooth    OTHER SURGICAL HISTORY      left shoulder - bone spurs    UPPER GASTROINTESTINAL ENDOSCOPY N/A 10/30/2019    EGD BIOPSY performed by Celeste Mayberry DO at 1 [facility] EXTRACTION      age late 25s, in [facility]     Family History   Problem Relation Age of Onset    Alcohol Abuse Father     Stroke Mother     Other Brother         gout    Diabetes Maternal Uncle      Social History     Socioeconomic History    Marital status: Single     Spouse name: Not on file    Number of children: 0    Years of education: Not on file    Highest education level: Not on file   Occupational History    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 states he stopped a couple of days ago   Substance and Sexual Activity    Alcohol use: Not Currently     Comment: binge drinker on weekends    Drug use: Not Currently    Sexual activity: Not on file   Lifestyle    Physical activity     Days per week: Not on file     Minutes per session: Not on file    Stress: Not on file   Relationships    Social connections     Talks on phone: Not on file     Gets together: Not on file     Attends Sabianism service: Not on file     Active member of club or organization: Not on file     Attends meetings of clubs or organizations: Not on file     Relationship status: Not on file    Intimate partner violence     Fear of current or ex partner: Not on file     Emotionally abused: Not on file     Physically abused: Not on file     Forced sexual activity: Not on file   Other Topics Concern    Not on file   Social History Narrative    Not on file       O: There were no vitals taken for this visit. Physical Exam  PHYSICAL EXAMINATION:  [ INSTRUCTIONS:  \"[x]\" Indicates a positive item  \"[]\" Indicates a negative item  -- DELETE ALL ITEMS NOT EXAMINED]  Vital Signs: (As obtained by patient/caregiver or practitioner observation)    Constitutional: [x] Appears well-developed and well-nourished [x] No apparent distress      [] Abnormal-   Mental status  [x] Alert and awake  [x] Oriented to person/place/time [x]Able to follow commands      Eyes:  EOM    [x]  Normal  [] Abnormal-  Sclera  [x]  Normal  [] Abnormal -         Discharge [x]  None visible  [] Abnormal -    HENT:   [x] Normocephalic, atraumatic.   [] Abnormal   [] Mouth/Throat: Mucous membranes are moist.     External Ears [x] Normal  [] Abnormal-     Neck: [x] No visualized mass     Pulmonary/Chest: [x] Respiratory effort normal.  [x] No visualized signs of difficulty breathing or respiratory distress        [] Abnormal-      Musculoskeletal:   [] Normal gait with no signs of ataxia         [x] Normal range of motion of neck        [] Abnormal-       Neurological:        [x] No Facial Asymmetry (Cranial nerve 7 motor function) (limited exam to video visit)          [x] No gaze palsy        [] Abnormal-         Skin:        [x] No significant exanthematous lesions or discoloration noted on facial skin         [] Abnormal-            Psychiatric:       [] Normal Affect [] No Hallucinations        [] Abnormal- anxious  Other pertinent observable physical exam findings-     Due to this being a TeleHealth encounter, evaluation of the following organ systems is limited: Vitals/Constitutional/EENT/Resp/CV/GI//MS/Neuro/Skin/Heme-Lymph-Imm. ASSESSMENT   Diagnosis Orders   1. Acute otalgia, right     2. Nicotine use disorder  nicotine polacrilex (NICORETTE) 2 MG gum     #1: mild fullness, ETD. #2:  Encouraged cessation. Discussed with patient treatment options, and programs to help pt quit. Pt verbalizes understanding, aware of risks of persistent tobacco usage. PLAN          If applicable, see additional patient information and instructions under \"Patient Instructions. \"    Return in about 3 weeks (around 10/30/2020). There are no Patient Instructions on file for this visit. TOTAL TIME (in minutes) SPENT ON CONFERENCIN    Please note a portion of this chart was generated using dragon dictation software. Although every effort was made to ensure the accuracy of this automated transcription, some errors in transcription may have occurred. Pursuant to the emergency declaration under the [facility], [facility] waiver authority and the [employer] Act, this Virtual  Visit was conducted, with patient's consent, to reduce the patient's risk of exposure to COVID-19 and provide continuity of care for an established patient.     Services were provided through a video synchronous discussion virtually to substitute for in-person clinic visit. Patient was instructed that the AVS is available on My Chart or was emailed to the patient if not on My Chart. Lab orders were emailed to patient if they do not use a [facility] lab. Any work notes were sent to patient through My Chart or email.

## 2020-10-16 NOTE — TELEPHONE ENCOUNTER
Use 1 piece of gum every 1 to 2 hours when you have cravings to smoke. The maximum number is 12 pieces per day.

## 2020-10-21 ENCOUNTER — HOSPITAL ENCOUNTER (OUTPATIENT)
Dept: MRI IMAGING | Age: 40
Discharge: HOME | End: 2020-10-21
Payer: MEDICAID

## 2020-10-21 PROCEDURE — 70551 MRI BRAIN STEM W/O DYE: CPT

## 2020-10-30 ENCOUNTER — TELEMEDICINE (OUTPATIENT)
Dept: FAMILY MEDICINE CLINIC | Age: 40
End: 2020-10-30
Payer: MEDICAID

## 2020-10-30 PROCEDURE — 99213 OFFICE O/P EST LOW 20 MIN: CPT | Performed by: FAMILY MEDICINE

## 2020-10-30 PROCEDURE — G8417 CALC BMI ABV UP PARAM F/U: HCPCS | Performed by: FAMILY MEDICINE

## 2020-10-30 PROCEDURE — G8484 FLU IMMUNIZE NO ADMIN: HCPCS | Performed by: FAMILY MEDICINE

## 2020-10-30 PROCEDURE — 4004F PT TOBACCO SCREEN RCVD TLK: CPT | Performed by: FAMILY MEDICINE

## 2020-10-30 PROCEDURE — G8427 DOCREV CUR MEDS BY ELIG CLIN: HCPCS | Performed by: FAMILY MEDICINE

## 2020-10-30 NOTE — PROGRESS NOTES
[facility]  TELEMEDICINE Progress Note  Dorie Diop DO      The risks and benefits of converting to a virtual visit were discussed in light of the current infectious disease epidemic. Patient also understood that insurance coverage and co-pays are up to their individual insurance plans. Patient identification was verified at the start of the visit. Anil Craft  1980    10/30/20    Patient location: Home address on file  Provider location: Physician's home    Chief Complaint:   Anil Craft is a 45 y.o. patient who is here for rinign of ears. HPI:   rining in ears continues. Headache continues. Has ear fullness and ringing in ears. Not tolerating the abx for H. Pylori due to increased chest pressure. ROS negative for headache, vision changes, chest pain, shortness of breath, abdominal pain, urinary sx, bowel changes. Past medical, surgical, and social history reviewed. Medications and allergies reviewed. No Known Allergies  Prior to Visit Medications    Medication Sig Taking? Authorizing Provider   nicotine polacrilex (NICORETTE) 2 MG gum Take 1 each by mouth as needed for Smoking cessation Yes Clarisse Loaiza DO   omeprazole (PRILOSEC) 40 MG delayed release capsule Take 1 capsule by mouth daily Yes Grey Cabello, DO   omeprazole (PRILOSEC) 20 MG delayed release capsule Take 1 capsule by mouth 2 times daily (before meals)  Patient not taking: Reported on 10/9/2020  Clarisse Loaiza DO          Patient-Reported Vitals 10/30/2020   Patient-Reported Weight 214   Patient-Reported Height 5'8\"      There were no vitals filed for this visit.    Wt Readings from Last 3 Encounters:   08/31/20 215 lb (97.5 kg)   02/20/20 215 lb 8 oz (97.8 kg)   11/01/19 217 lb (98.4 kg)     BP Readings from Last 3 Encounters:   08/31/20 122/74   02/20/20 (!) 122/90   02/06/20 110/84       Patient Active Problem List   Diagnosis    Gout    Chronic back pain  Impingement syndrome, shoulder    Vitamin D deficiency    Elevated glucose    Prediabetes       Immunization History   Administered Date(s) Administered    Tdap (Boostrix, Adacel) 01/25/2017       Past Medical History:   Diagnosis Date    GERD (gastroesophageal reflux disease)     Gout     Influenza 02/25/2017    Prediabetes 02/2020    Vitamin D deficiency 02/2020     Past Surgical History:   Procedure Laterality Date    DENTAL SURGERY      root canal / cracked tooth    OTHER SURGICAL HISTORY      left shoulder - bone spurs    UPPER GASTROINTESTINAL ENDOSCOPY N/A 10/30/2019    EGD BIOPSY performed by Anahi Crowe DO at 1 [facility] EXTRACTION      age late 25s, in [facility]     Family History   Problem Relation Age of Onset    Alcohol Abuse Father     Stroke Mother     Other Brother         gout    Diabetes Maternal Uncle      Social History     Socioeconomic History    Marital status: Single     Spouse name: Not on file    Number of children: 0    Years of education: Not on file    Highest education level: Not on file   Occupational History    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 states he stopped a couple of days ago   Substance and Sexual Activity    Alcohol use: Not Currently     Comment: binge drinker on weekends    Drug use: Not Currently    Sexual activity: Not on file   Lifestyle    Physical activity     Days per week: Not on file     Minutes per session: Not on file    Stress: Not on file   Relationships    Social connections     Talks on phone: Not on file     Gets together: Not on file     Attends Islam service: Not on file     Active member of club or organization: Not on file     Attends meetings of clubs or organizations: Not on file     Relationship status: Not on file    Intimate partner violence     Fear of current or ex partner: Not on file     Emotionally abused: Not on file     Physically abused: Not on file     Forced sexual activity: Not on file   Other Topics Concern    Not on file   Social History Narrative    Not on file       O: There were no vitals taken for this visit. Physical Exam  PHYSICAL EXAMINATION:  [ INSTRUCTIONS:  \"[x]\" Indicates a positive item  \"[]\" Indicates a negative item  -- DELETE ALL ITEMS NOT EXAMINED]  Vital Signs: (As obtained by patient/caregiver or practitioner observation)    Constitutional: [x] Appears well-developed and well-nourished [x] No apparent distress      [] Abnormal-   Mental status  [x] Alert and awake  [x] Oriented to person/place/time [x]Able to follow commands      Eyes:  EOM    [x]  Normal  [] Abnormal-  Sclera  [x]  Normal  [] Abnormal -         Discharge [x]  None visible  [] Abnormal -    HENT:   [x] Normocephalic, atraumatic.   [] Abnormal   [] Mouth/Throat: Mucous membranes are moist.     External Ears [x] Normal  [] Abnormal-     Neck: [x] No visualized mass     Pulmonary/Chest: [x] Respiratory effort normal.  [x] No visualized signs of difficulty breathing or respiratory distress        [] Abnormal-      Musculoskeletal:   [] Normal gait with no signs of ataxia         [x] Normal range of motion of neck        [] Abnormal-       Neurological:        [x] No Facial Asymmetry (Cranial nerve 7 motor function) (limited exam to video visit)          [x] No gaze palsy        [] Abnormal-         Skin:        [x] No significant exanthematous lesions or discoloration noted on facial skin         [] Abnormal-            Psychiatric:       [x] Normal Affect [] No Hallucinations        []

## 2020-11-03 ENCOUNTER — OFFICE VISIT (OUTPATIENT)
Dept: ENT CLINIC | Age: 40
End: 2020-11-03
Payer: MEDICAID

## 2020-11-03 VITALS — SYSTOLIC BLOOD PRESSURE: 129 MMHG | TEMPERATURE: 97.7 F | DIASTOLIC BLOOD PRESSURE: 90 MMHG | HEART RATE: 99 BPM

## 2020-11-03 PROBLEM — H93.13 SUBJECTIVE TINNITUS OF BOTH EARS: Status: ACTIVE | Noted: 2020-11-03

## 2020-11-03 PROBLEM — R42 DISEQUILIBRIUM: Status: ACTIVE | Noted: 2020-11-03

## 2020-11-03 PROCEDURE — G8427 DOCREV CUR MEDS BY ELIG CLIN: HCPCS | Performed by: OTOLARYNGOLOGY

## 2020-11-03 PROCEDURE — 4004F PT TOBACCO SCREEN RCVD TLK: CPT | Performed by: OTOLARYNGOLOGY

## 2020-11-03 PROCEDURE — G8417 CALC BMI ABV UP PARAM F/U: HCPCS | Performed by: OTOLARYNGOLOGY

## 2020-11-03 PROCEDURE — G8484 FLU IMMUNIZE NO ADMIN: HCPCS | Performed by: OTOLARYNGOLOGY

## 2020-11-03 PROCEDURE — 99204 OFFICE O/P NEW MOD 45 MIN: CPT | Performed by: OTOLARYNGOLOGY

## 2020-11-03 NOTE — PATIENT INSTRUCTIONS
ENG TESTING INSTRUCTIONS      The inner ear has two main components, one for hearing and one for equilibrium balance. In order to diagnose dizziness, we need to test both components. Toward that end, I have recommended an audiogram (hearing test) and ENG (electronystagmogram), or balance system testing. This testing will take approximately 1.5 to 2 hours. Please be in time as the audiologist runs on schedule him and your test will need to be rescheduled if you arrive after the scheduled starting time. Until your testing is completed and you have a follow up visit, please maintain these dizziness activity precautions:  1. Avoid working at [place], on ladders or scaffolding or near the edges of platforms or using heavy or complicated machinery or operating a motorized vehicle, or any activity where loss of consciousness or equilibrium would be hazardous to yourself or others, if you are experiencing dizziness, and until having been dizzy free for 72 hours. Even then, take appropriate care and precautions as dizziness could occur at any time. 2. Avoid any motions or activity that results in the off balance sensation. Stand up or arise slowly and in stages, as we discussed. YOU MUST NOT TAKE ANY OF THE FOLLOWING MEDICATIONS FOR 48 - 72 HOURS BEFORE YOUR TEST:  · Any medications that can make you drowsy or sleepy  · Allergy pills  · Sedative pills   · Tranquilizers/anti-anxiety medications (Valium, Librium, Xanax, Ativan, etc.)  · Sleeping pills   · Antihistamines/Decongestants (Benadryl, Sudafed, Dimetapp, Chlor-Trimeton)  · Pain pills/Narcotics/Barbiturates (codeine, Demerol, hydrocodone, Norco, Vicodin, oxycodone, Percocet, Percodan, OxyContin, tramadol, phenobarbital, antidepressants)      · Diet pills. · Nerve/muscle relaxant pills (Robaxin, Valium)  · Anti-dizziness pills.   (meclizine, Antivert, Bonine, ear patches, clonazepam/Klonopin, scopolamine/TRANSDERM-SCOP, etc.)  · Anti-nausea medication.  (promethazine/Phenergan)  · Anti motion sickness medications (Dramamine)     · Aspirin or aspirin substitutes (Tylenol)  · Antidepressant medications;   · Alcohol (any form, beer wine whisky vodka, etc)  · Discontinued all medications, for 48 hours prior to the testing, except \"maintenance\" medications for your heart, blood pressure, diabetes, or seizures, and any medications deemed by your primary physician to be necessary. DO NOT STOP anti-seizure medications. Please consult your primary physician with any questions. MAKE SURE YOU CLEAR IT WITH YOUR PRIMARY CARE PHYSICIAN BEFORE STOPPING ANY MEDICATIONS. ADDITIONAL INSTRUCTIONS:  · Have a light breakfast and/or lunch on the day of testing. Diabetic patients must light breakfast or lunch prior to testing. · Clean face, no makeup. · Remove contact lenses before the examination. (Bring your eyeglasses). · No solid foods for 2-4 hours before testing. · No coffee, tea, or cola after midnight on the day of testing. · No alcohol containing beverages or liquid medication containing alcohol for 48 hours before the testing. · Someone will need to drive you home after the ENG testing. Do not drive an automobile for two hours after the ENG testing. · Wear comfortable loose fitting clothing on the day of testing.

## 2020-11-03 NOTE — PROGRESS NOTES
[facility]       NEW PATIENT VISIT    PCP:  Joselyn Rajan DO    REFERRED BY:  Joselyn Rajan DO       CHIEF COMPLAINT:  Chief Complaint   Patient presents with    Dizziness       HISTORY OF PRESENT ILLNESS:      Lillian Herr is a 45 y.o. male, referred by Dr. Marisol Valentino for evaluation and treatment of a problem located in the ears/head. The quality is dizziness. The severity is moderate to severe. The timing is intermittent, off and on, comes and goes. The duration of this problem is 8 months. The onset of this problem occurred about the second week of March, 2020. The context is onset with no antecedent illness or head or ear injury. Modifying factors include \"it's better if I stand up and walk around. \"  Associated symptoms include headache. Dizziness was described as a sensation of \"like I'm losing my balance, like you're just about to turn out the light. Nothing is spinning. \"  He denied any sensation of the room or environment spinning or moving. He denied any actual falls or loss of consciousness. \"It's just mainly me, like when you get on heights and are afraid of heights. \"  No precipitating motions, positions, activities, or other factors have been noted. There is no change in hearing or change in tinnitus during dizziness. Tinnitus ringing both ears off and on for about two weeks and not before. Hearing sounds muffled sometimes like his hands over his ears. He stated that he went to a chiropractor in the past for headaches and dizziness. However he stopped going to the chiropractor because it was not helping his headaches. Then, he started going to a deep massage therapist, and his headache stopped. REVIEW OF SYSTEMS:      CONSTITUTIONAL:  Denied fever. Chills  Denied unexplained weight loss, over 20 pounds in the past six months. EYES:   Denied double vision and pain. EARS, NOSE, THROAT:  Denied otorrhea, otalgia, rhinorrhea, nasal dyspnea, sore throat and hoarseness. CARDIOVASCULAR:  Denied chest pains. Denied syncope. RESPIRATORY:  Denied dyspnea. Denied chronic cough. GASTROINTESTINAL:  Denied dysphagia. Denied hematemesis. MUSCULOSKELETAL:    (+) Gout. Denied pain in joints, arthritis, and pain in bones. INTEGUMENTARY (SKIN):  Denied hives. Denied non-healing skin ulcers/lesions. NEUROLOGIC:    (+) chronic or frequently recurrent headache for four months every day, lasts up to minutes. Denied paralysis of any body parts. HEMATOLOGIC/LYMPHATIC:  Denied prolonged and excessive bleeding. Denied enlarged lymph nodes. ALLERGIC/IMMUNOLOGIC:  Denied seasonal or environmental allergies. Denied frequent infections. ENDOCRINE:  Denied diabetes. Denied thyroid disorders.          PAST MEDICAL, FAMILY, AND SOCIAL HISTORY:       Past Medical History:   Diagnosis Date    GERD (gastroesophageal reflux disease)     Gout     Influenza 02/25/2017    Prediabetes 02/2020    Vitamin D deficiency 02/2020         Past Surgical History:   Procedure Laterality Date    DENTAL SURGERY      root canal / cracked tooth    OTHER SURGICAL HISTORY      left shoulder - bone spurs    UPPER GASTROINTESTINAL ENDOSCOPY N/A 10/30/2019    EGD BIOPSY performed by Yamil Atkins DO at [facility] EXTRACTION      age late 25s, in [facility]         Family History   Problem Relation Age of Onset    Alcohol Abuse Father     Stroke Mother     Other Brother         gout    Diabetes Maternal Uncle          Social History     Socioeconomic History    Marital status: Single     Spouse name: Not on file    Number of children: 0    Years of education: Not on file    Highest education level: Not on file   Occupational History    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 No carotid bruits. Lymphatic:   · PALPATION OF LYMPH NODES, CERVICAL, FACIAL AND SUPRACLAVICULAR: Nontender, No lymphadenopathy. Neurological/Psychiatric:    · CRANIAL NERVES:  II - XII intact, with no apparent deficits. · ORIENTATION TO TIME, PLACE, AND PERSON:  Oriented x 3.  · MOOD AND AFFECT:  Normal.  No evidence of depression, anxiety or agitation. CEREBELLAR TESTING:  Finger to nose intact. Heel to shin intact. Rapid alternating motion intact. REVIEW OF IMAGES       I independently reviewed the images of the MRI scan of the brain, without contrast, showing no evidence of vestibular schwannoma. Narrative    EXAMINATION:    MRI OF THE BRAIN WITHOUT CONTRAST  10/21/2020 2:02 pm      FINDINGS:    INTRACRANIAL STRUCTURES/VENTRICLES: There is no acute infarct. No mass effect    or midline shift. No evidence of an acute intracranial hemorrhage.  The    ventricles and sulci are normal in size and configuration.  The    sellar/suprasellar regions appear unremarkable.  The normal signal voids    within the major intracranial vessels appear maintained.         ORBITS: The visualized portion of the orbits demonstrate no acute abnormality.         SINUSES: The visualized paranasal sinuses and mastoid air cells are well    aerated.         BONES/SOFT TISSUES: The bone marrow signal intensity appears normal. The soft    tissues demonstrate no acute abnormality.              Impression    Unremarkable MRI of the brain.                 IMPRESSION / Lilli Bingham / Giovanni Crystal:       Lisbeth Feldman was seen today for dizziness. 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 Subjective tinnitus of both ears    Bilateral hearing loss, unspecified hearing loss type        RECOMMENDATIONS / PLAN:  1. Audiogram/ENG  Dr. Nazia Ochoa, clock [place].   Patient declined the option of going to [facility] due to the distance involved. 2. See Patient Instructions on file for this visit, which were fully discussed with the patient. 3. Return for recheck/follow-up after audiogram and ENG testing.

## 2020-11-13 ENCOUNTER — VIRTUAL VISIT (OUTPATIENT)
Dept: FAMILY MEDICINE CLINIC | Age: 40
End: 2020-11-13
Payer: MEDICAID

## 2020-11-13 PROCEDURE — 99213 OFFICE O/P EST LOW 20 MIN: CPT | Performed by: FAMILY MEDICINE

## 2020-11-13 PROCEDURE — G8427 DOCREV CUR MEDS BY ELIG CLIN: HCPCS | Performed by: FAMILY MEDICINE

## 2020-11-13 NOTE — PROGRESS NOTES
[facility]  TELEMEDICINE Progress Note  Vanda Mueller DO      The risks and benefits of converting to a virtual visit were discussed in light of the current infectious disease epidemic. Patient also understood that insurance coverage and co-pays are up to their individual insurance plans. Patient identification was verified at the start of the visit. Gerard Najera  1980    11/13/20    Patient location: Home address on file  Provider location: Physician's home    Chief Complaint:   Gerard Najera is a 45 y.o. patient who is here for GERD and vertigo        HPI:   Saw ENT. Realizes. that being at tall heights provokes dizziness. Prilosec helps him at at times. Will get his test.      ROS negative for headache, vision changes,shortness of breath, abdominal pain, urinary sx, bowel changes. Past medical, surgical, and social history reviewed. Medications and allergies reviewed. No Known Allergies  Prior to Visit Medications    Medication Sig Taking? Authorizing Provider   nicotine polacrilex (NICORETTE) 2 MG gum Take 1 each by mouth as needed for Smoking cessation Yes Gasper Caballero DO   omeprazole (PRILOSEC) 40 MG delayed release capsule Take 1 capsule by mouth daily Yes Gasper Caballero, DO          Patient-Reported Vitals 10/30/2020   Patient-Reported Weight 214   Patient-Reported Height 5'8\"      There were no vitals filed for this visit.    Wt Readings from Last 3 Encounters:   08/31/20 215 lb (97.5 kg)   02/20/20 215 lb 8 oz (97.8 kg)   11/01/19 217 lb (98.4 kg)     BP Readings from Last 3 Encounters:   11/03/20 (!) 129/90   08/31/20 122/74   02/20/20 (!) 122/90       Patient Active Problem List   Diagnosis    Gout    Chronic back pain    Impingement syndrome, shoulder    Vitamin D deficiency    Elevated glucose    Prediabetes    Disequilibrium    Subjective tinnitus of both ears       Immunization History   Administered Date(s) Administered    Tdap (Boostrix, Adacel) 01/25/2017       Past Medical History:   Diagnosis Date    GERD (gastroesophageal reflux disease)     Gout     Influenza 02/25/2017    Prediabetes 02/2020    Vitamin D deficiency 02/2020     Past Surgical History:   Procedure Laterality Date    DENTAL SURGERY      root canal / cracked tooth    OTHER SURGICAL HISTORY      left shoulder - bone spurs    UPPER GASTROINTESTINAL ENDOSCOPY N/A 10/30/2019    EGD BIOPSY performed by Bobby Ball DO at [facility] EXTRACTION      age late 25s, in [facility]     Family History   Problem Relation Age of Onset    Alcohol Abuse Father     Stroke Mother     Other Brother         gout    Diabetes Maternal Uncle      Social History     Socioeconomic History    Marital status: Single     Spouse name: Not on file    Number of children: 0    Years of education: Not on file    Highest education level: Not on file   Occupational History    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 states he stopped a couple of days ago   Substance and Sexual Activity    Alcohol use: Not Currently     Comment: binge drinker on weekends    Drug use: Not Currently    Sexual activity: Not on file   Lifestyle    Physical activity     Days per week: Not on file     Minutes per session: Not on file    Stress: Not on file   Relationships    Social connections     Talks on phone: Not on file     Gets together: Not on file     Attends Congregation service: Not on file     Active member of club or organization: Not on file     Attends meetings of clubs or organizations: Not on file     Relationship status: Not on file    Intimate partner violence     Fear of current or ex partner: Not on file     Emotionally abused: Not on file     Physically abused: Not on file     Forced sexual activity: Not on file   Other Topics Concern    Not on file   Social History Narrative    Not on file       O: There were no vitals taken for this visit. Physical Exam  PHYSICAL EXAMINATION:  [ INSTRUCTIONS:  \"[x]\" Indicates a positive item  \"[]\" Indicates a negative item  -- DELETE ALL ITEMS NOT EXAMINED]  Vital Signs: (As obtained by patient/caregiver or practitioner observation)    Constitutional: [x] Appears well-developed and well-nourished [x] No apparent distress      [] Abnormal-   Mental status  [x] Alert and awake  [x] Oriented to person/place/time [x]Able to follow commands      Eyes:  EOM    [x]  Normal  [] Abnormal-  Sclera  [x]  Normal  [] Abnormal -         Discharge [x]  None visible  [] Abnormal -    HENT:   [x] Normocephalic, atraumatic.   [] Abnormal   [] Mouth/Throat: Mucous membranes are moist.     External Ears [x] Normal  [] Abnormal-     Neck: [x] No visualized mass     Pulmonary/Chest: [x] Respiratory effort normal.  [x] No visualized signs of difficulty breathing or respiratory distress        [] Abnormal-      Musculoskeletal:   [] Normal gait with no signs of ataxia         [x] Normal range of motion of neck        [] Abnormal-       Neurological:        [x] No Facial Asymmetry (Cranial nerve 7 motor function) (limited exam to video visit)          [x] No gaze palsy        [] Abnormal-         Skin:        [x] No significant exanthematous lesions or discoloration noted on facial skin         [] Abnormal-            Psychiatric:       [x] Normal Affect [] No Hallucinations        [] Abnormal-     Other pertinent observable physical exam findings- 11-20    Due to this being a TeleHealth encounter, evaluation of the following organ systems is limited: Vitals/Constitutional/EENT/Resp/CV/GI//MS/Neuro/Skin/Heme-Lymph-Imm. ASSESSMENT   Diagnosis Orders   1. Vertigo         Asked to review website [facility] and f/u with ENG testing. PLAN          If applicable, see additional patient information and instructions under \"Patient Instructions. \"    Return if symptoms worsen or fail to improve. Patient Instructions   CUT BACK SALT INTAKE SUCH AS CORNED BEEF DOWN TO ONCE A WEEK. SEE DR. Hemal Cooney ON Monday. TOTAL TIME (in minutes) SPENT ON CONFERENCIN-20    Please note a portion of this chart was generated using dragon dictation software. Although every effort was made to ensure the accuracy of this automated transcription, some errors in transcription may have occurred. Pursuant to the emergency declaration under the [facility], 1135 waiver authority and the [employer] Act, this Virtual  Visit was conducted, with patient's consent, to reduce the patient's risk of exposure to COVID-19 and provide continuity of care for an established patient. Services were provided through a video synchronous discussion virtually to substitute for in-person clinic visit. Patient was instructed that the AVS is available on My Chart or was emailed to the patient if not on My Chart. Lab orders were emailed to patient if they do not use a [facility] lab. Any work notes were sent to patient through My Chart or email.

## 2020-11-16 ENCOUNTER — OFFICE VISIT (OUTPATIENT)
Dept: INTERNAL MEDICINE CLINIC | Age: 40
End: 2020-11-16
Payer: MEDICAID

## 2020-11-16 VITALS
SYSTOLIC BLOOD PRESSURE: 107 MMHG | WEIGHT: 219.2 LBS | DIASTOLIC BLOOD PRESSURE: 73 MMHG | BODY MASS INDEX: 33.22 KG/M2 | HEIGHT: 68 IN | RESPIRATION RATE: 20 BRPM | TEMPERATURE: 96.4 F | HEART RATE: 100 BPM | OXYGEN SATURATION: 99 %

## 2020-11-16 PROCEDURE — 99213 OFFICE O/P EST LOW 20 MIN: CPT | Performed by: INTERNAL MEDICINE

## 2020-11-16 RX ORDER — OMEPRAZOLE 40 MG/1
40 CAPSULE, DELAYED RELEASE ORAL 2 TIMES DAILY
Qty: 30 CAPSULE | Refills: 1 | Status: SHIPPED | OUTPATIENT
Start: 2020-11-16 | End: 2020-12-31

## 2020-11-16 NOTE — PATIENT INSTRUCTIONS
Please take omeprazole twice daily. Take Pylera for 10-days. Have stool sample collected 4 weeks after finishing antibiotics.

## 2020-11-16 NOTE — PROGRESS NOTES
[address] [facility],  [address] [place] [address] Ave  [place], [address] [place] [address]  Phone: [phone]   [facility]:[phone]    CHIEF COMPLAINT     Helicobacter pylori infection     HPI     Thank you Lynn Neal DO for asking me to see Rosenda Green in consultation. Rosenda Green is a 45 y.o. male Single [1] / [5] with medical history of GERD who presents to [facility]. outpatient clinic for follow-up of Helicobacter pylori infection. The patient states that he was first diagnosed with H. Pylori several months ago and was prescribed a triple therapy regimen consisting of amoxicillin, levofloxacin and omeprazole. He reports that he stopped taking these medications after one day of therapy due to increasingly severe heartburn. He reports that his nausea, GERD and epigastric pain have worsened since being diagnosed and is interested in trying a different regimen to eradicate his underlying Helicobacter pylori infection. He reports feeling well otherwise and denies fever/chills, shortness of breath or chest pain. He denies recent weight loss, changes in stool or loss of appetite. Last Encounter Reviewed: Yes. Pertinent PMH, FH, SH is reviewed below. Last EGD: 10/30/2019  Last Colonoscopy: None. Review of available records reveals:   Wt Readings from Last 50 Encounters:   11/16/20 219 lb 3.2 oz (99.4 kg)   08/31/20 215 lb (97.5 kg)   02/20/20 215 lb 8 oz (97.8 kg)   11/01/19 217 lb (98.4 kg)   10/30/19 210 lb (95.3 kg)   10/02/19 210 lb 9.6 oz (95.5 kg)   10/01/19 210 lb (95.3 kg)   09/28/19 215 lb (97.5 kg)   09/20/19 211 lb 12.8 oz (96.1 kg)   09/04/19 211 lb (95.7 kg)   08/17/19 220 lb (99.8 kg)   01/18/19 220 lb (99.8 kg)   11/16/18 213 lb (96.6 kg)   10/30/18 209 lb (94.8 kg)   08/31/18 209 lb (94.8 kg)   08/24/18 209 lb 9.6 oz (95.1 kg)   08/07/18 204 lb 3.2 oz (92.6 kg)   05/25/18 207 lb 12.8 oz (94.3 kg)   02/25/17 195 lb 2 oz (88.5 kg)   09/01/16 191 lb (86.6 kg)   10/08/14 185 lb (83.9 kg)   10/02/14 179 lb (81.2 kg)   07/08/14 171 lb (77.6 kg)   01/26/12 172 lb (78 kg)   11/28/11 180 lb (81.6 kg)   10/31/11 176 lb 12.8 oz (80.2 kg)   10/17/11 180 lb (81.6 kg)   09/21/11 179 lb (81.2 kg)       No components found for: HGBA1C  BP Readings from Last 3 Encounters:   11/16/20 107/73   11/03/20 (!) 129/90   08/31/20 122/74     Health Maintenance   Topic Date Due    Varicella vaccine (1 of 2 - 2-dose childhood series) 03/01/1982    Flu vaccine (1) 09/01/2020    A1C test (Diabetic or Prediabetic)  09/19/2021    DTaP/Tdap/Td vaccine (2 - Td) 01/25/2027    HIV screen  Completed    Hepatitis A vaccine  Aged Out    Hepatitis B vaccine  Aged Out    Hib vaccine  Aged Out    Meningococcal (ACWY) vaccine  Aged Out    Pneumococcal 0-64 years Vaccine  Aged Out       No components found for: [facility]     PAST MEDICAL HISTORY     Past Medical History:   Diagnosis Date    GERD (gastroesophageal reflux disease)     Gout     Influenza 02/25/2017    Prediabetes 02/2020    Vitamin D deficiency 02/2020     FAMILY HISTORY     Family History   Problem Relation Age of Onset    Alcohol Abuse Father     Stroke Mother     Other Brother         gout    Diabetes Maternal Uncle      SOCIAL HISTORY     Social History     Socioeconomic History    Marital status: Single     Spouse name: Not on file    Number of children: 0    Years of education: Not on file    Highest education level: Not on file   Occupational History    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 states he stopped a couple of days ago   Substance and Sexual Activity    Alcohol use: Not Currently     Comment: binge drinker on weekends    Drug use: Not Currently    Sexual activity: Not on file   Lifestyle    Physical activity     Days per week: Not on file     Minutes per session: Not on file    Stress: Not on file   Relationships    Social connections     Talks on phone: Not on file     Gets together: Not on file     Attends Congregation service: Not on file     Active member of club or organization: Not on file     Attends meetings of clubs or organizations: Not on file     Relationship status: Not on file    Intimate partner violence     Fear of current or ex partner: Not on file     Emotionally abused: Not on file     Physically abused: Not on file     Forced sexual activity: Not on file   Other Topics Concern    Not on file   Social History Narrative    Not on file     SURGICAL HISTORY     Past Surgical History:   Procedure Laterality Date    DENTAL SURGERY      root canal / cracked tooth    OTHER SURGICAL HISTORY      left shoulder - bone spurs    UPPER GASTROINTESTINAL ENDOSCOPY N/A 10/30/2019    EGD BIOPSY performed by Valerio Mcgovenr DO at [address] [place] [address]      age late 25s, in [address] [place] [address]   (This list may include medications prescribed during this encounter as epic can not insert only the list prior to this encounter.)  Current Outpatient Rx   Medication Sig Dispense Refill    bismuth-metronidazole-tetracycline (PLYERA) 140-125-125 MG per capsule Take 3 capsules by mouth 4 times daily (before meals and nightly) for 10 days Separate into individual components if cheaper for patient 120 capsule 0    omeprazole (PRILOSEC) 40 MG delayed release capsule Take 1 capsule by mouth 2 times daily 30 capsule 1    nicotine polacrilex (NICORETTE) 2 MG gum Take 1 each by mouth as needed for Smoking cessation (Patient not taking: Reported on 11/16/2020) 110 each 3     ALLERGIES   No Known Allergies  IMMUNIZATIONS     Immunization History   Administered Date(s) Administered    Tdap (Boostrix, Adacel) 01/25/2017     REVIEW OF SYSTEMS   See HPI for further details and pertinent postiives. Negative for the following:  Constitutional: Negative for weight change. Negative for appetite change and fatigue. HENT: Negative for nosebleeds, sore throat, mouth sores, and voice change. Respiratory: Negative for cough, choking and chest tightness. Cardiovascular: Negative for chest pain   Gastrointestinal: See HPI  Musculoskeletal: Negative for arthralgias. Skin: Negative for pallor. Neurological: Positive for frequent dizziness and vertigo. Hematological: Negative for adenopathy. Does not bruise/bleed easily. Psychiatric/Behavioral: Negative for suicidal ideas. PHYSICAL EXAM   VITAL SIGNS: /73 (Site: Left Upper Arm, Position: Sitting, Cuff Size: Large Adult)   Pulse 100   Temp 96.4 °F (35.8 °C) (Temporal)   Resp 20   Ht 5' 8\" (1.727 m)   Wt 219 lb 3.2 oz (99.4 kg)   SpO2 99%   BMI 33.33 kg/m²   Wt Readings from Last 3 Encounters:   11/16/20 219 lb 3.2 oz (99.4 kg)   08/31/20 215 lb (97.5 kg)   02/20/20 215 lb 8 oz (97.8 kg)     Constitutional: Well developed, Well nourished, No acute distress, Non-toxic appearance. HENT: Normocephalic, Atraumatic, Bilateral external ears normal, Oropharynx moist, No oral exudates, Nose normal.   Eyes: Conjunctiva normal, No discharge. Neck: Normal range of motion, No tenderness, Supple, No stridor. Lymphatic: No cervical, subclavian, or axillary lymphadenopathy. Cardiovascular: Normal heart rate, Normal rhythm, No murmurs, No rubs, No gallops. Thorax & Lungs: Normal breath sounds, No respiratory distress, No wheezing, No chest tenderness. No gynecomastia. Abdomen: Pannus abdomen, normal bowel sounds, soft, non tender, non distended, scars consistent with stated surgeries, no hernias. Rectal:  Deferred.   Skin: Warm, Dry, No erythema, No rash. No bruising. No spider hemangiomas. Back: No tenderness, No CVA tenderness. Lower Extremities: Intact distal pulses, No edema, No tenderness, No cyanosis, No clubbing. Neurologic: Alert & oriented x 3, Normal motor function, Normal sensory function, No focal deficits noted. No asterixis. RADIOLOGY/PROCEDURES   Mri Brain Wo Contrast    Result Date: 10/21/2020  Unremarkable MRI of the brain. FINAL IMPRESSION   Diagnoses and all orders for this visit:    H. pylori infection  -Will prescribe Pylera and pantoprazole 40 mg bid   -Patient will need Helicobacter pylori stool antigen six weeks after completion of treatment     No orders of the defined types were placed in this encounter. ORDERED FUTURE/PENDING TESTS       FOLLOWUP     Follow-up in three months as scheduled.        Mireille Bacon 11/16/20 2:49 PM EST    CC:  Lupis Perez DO

## 2020-11-18 ENCOUNTER — TELEPHONE (OUTPATIENT)
Dept: INTERNAL MEDICINE CLINIC | Age: 40
End: 2020-11-18

## 2020-11-25 ENCOUNTER — TELEPHONE (OUTPATIENT)
Dept: GASTROENTEROLOGY | Age: 40
End: 2020-11-25

## 2020-11-25 NOTE — TELEPHONE ENCOUNTER
Pt states he took plyera and he started having a lot of pain in his rt side, almost to the point he thought he might had to go to ER. He hasn't taken it since. Would like to speak to someone.

## 2020-12-04 ENCOUNTER — VIRTUAL VISIT (OUTPATIENT)
Dept: FAMILY MEDICINE CLINIC | Age: 40
End: 2020-12-04
Payer: MEDICAID

## 2020-12-04 PROCEDURE — 99213 OFFICE O/P EST LOW 20 MIN: CPT | Performed by: FAMILY MEDICINE

## 2020-12-04 NOTE — PROGRESS NOTES
Prediabetes    Disequilibrium    Subjective tinnitus of both ears       Immunization History   Administered Date(s) Administered    Tdap (Boostrix, Adacel) 01/25/2017       Past Medical History:   Diagnosis Date    GERD (gastroesophageal reflux disease)     Gout     Influenza 02/25/2017    Prediabetes 02/2020    Vitamin D deficiency 02/2020     Past Surgical History:   Procedure Laterality Date    DENTAL SURGERY      root canal / cracked tooth    OTHER SURGICAL HISTORY      left shoulder - bone spurs    UPPER GASTROINTESTINAL ENDOSCOPY N/A 10/30/2019    EGD BIOPSY performed by Domenic Soares DO at [facility] EXTRACTION      age late 25s, in [facility]     Family History   Problem Relation Age of Onset    Alcohol Abuse Father     Stroke Mother     Other Brother         gout    Diabetes Maternal Uncle      Social History     Socioeconomic History    Marital status: Single     Spouse name: Not on file    Number of children: 0    Years of education: Not on file    Highest education level: Not on file   Occupational History    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 states he stopped a couple of days ago   Substance and Sexual Activity    Alcohol use: Not Currently     Comment: binge drinker on weekends    Drug use: Not Currently    Sexual activity: Not on file   Lifestyle    Physical activity     Days per week: Not on file     Minutes per session: Not on file    Stress: Not on file   Relationships    Social connections     Talks on phone: Not on file     Gets

## 2020-12-09 ENCOUNTER — TELEPHONE (OUTPATIENT)
Dept: GASTROENTEROLOGY | Age: 40
End: 2020-12-09

## 2020-12-09 RX ORDER — SUCRALFATE 1 G/1
1 TABLET ORAL 3 TIMES DAILY
Qty: 90 TABLET | Refills: 3 | Status: SHIPPED | OUTPATIENT
Start: 2020-12-09 | End: 2020-12-31

## 2020-12-09 NOTE — TELEPHONE ENCOUNTER
Patient of Dr Clara Shore seen at [facility]  Please make sure he finished the Pylera for H pylori  Confirm he is taking the PPI (either Omeprazole or Pantoprazole, not sure which one he is on) twice daily  Can try adding Carafate 1 gm tid, sent to pharmacy  He will need a followup office visit with Dr Clara Shore to reassess  If pain is intense/unrelenting should go to the ED for imaging.

## 2020-12-18 ENCOUNTER — HOSPITAL ENCOUNTER (OUTPATIENT)
Age: 40
Discharge: HOME | End: 2020-12-18
Payer: MEDICAID

## 2020-12-18 PROCEDURE — 87338 HPYLORI STOOL AG IA: CPT

## 2020-12-21 LAB — H PYLORI ANTIGEN STOOL: NEGATIVE

## 2020-12-30 NOTE — PROGRESS NOTES
[address] [facility],  [address] [place] [address], [address] [place] [address]  Phone: [phone]    2020    TELEHEALTH EVALUATION -- Audio/Visual (During IEZZC-63 public health emergency)    [facility]     Chief Complaint   Patient presents with    Follow-up     1 month H. pylori gastritis       HPI     Thank you Surendra Mandujano DO for asking me to see Gavino Bonilla in consultation. Gavino Bonilla  (:  1980) is a 45 y.o. male Single [1] / [5] with medical history of H. pylori gastritis status bismuth, metronidazole and tetracycline  (pylera) therapy has requested an audio/video evaluation for the following concern(s): H. pylori gastritis. Patient reports feeling improved. Stool H. pylori testing on 2020 is negative confirming H. pylori eradication. He reports cessation of alcohol use, last in 2020. He also stopped swallowing tobacco that he chews. He however reports occasional epigastric discomfort. Denies nausea, vomiting, fever, chills, unintentional weight loss, constipation, diarrhea, hematochezia or melenic stools. Last Encounter Reviewed: 2020  45 y.o. male Single [1] / [5] with medical history of GERD who presents to [facility] outpatient clinic for follow-up of Helicobacter pylori infection. The patient states that he was first diagnosed with H. Pylori several months ago and was prescribed a triple therapy regimen consisting of amoxicillin, levofloxacin and omeprazole. He reports that he stopped taking these medications after one day of therapy due to increasingly severe heartburn. He reports that his nausea, GERD and epigastric pain have worsened since being diagnosed and is interested in trying a different regimen to eradicate his underlying Helicobacter pylori infection.  He reports feeling well otherwise and denies fever/chills, shortness of breath or chest pain. He denies recent weight loss, changes in stool or loss of appetite    Pertinent PMH, FH, SH is reviewed below. Last EGD 10/30/2019 by Dr. Downs: Normal esophagus gastritis, biopsied (path-mild chronic active gastritis with focal erosion, negative for malignancy, H. pylori positive) normal duodenum. Last Colonoscopy: None    Review of available records reveals:   Wt Readings from Last 50 Encounters:   11/16/20 219 lb 3.2 oz (99.4 kg)   08/31/20 215 lb (97.5 kg)   02/20/20 215 lb 8 oz (97.8 kg)   11/01/19 217 lb (98.4 kg)   10/30/19 210 lb (95.3 kg)   10/02/19 210 lb 9.6 oz (95.5 kg)   10/01/19 210 lb (95.3 kg)   09/28/19 215 lb (97.5 kg)   09/20/19 211 lb 12.8 oz (96.1 kg)   09/04/19 211 lb (95.7 kg)   08/17/19 220 lb (99.8 kg)   01/18/19 220 lb (99.8 kg)   11/16/18 213 lb (96.6 kg)   10/30/18 209 lb (94.8 kg)   08/31/18 209 lb (94.8 kg)   08/24/18 209 lb 9.6 oz (95.1 kg)   08/07/18 204 lb 3.2 oz (92.6 kg)   05/25/18 207 lb 12.8 oz (94.3 kg)   02/25/17 195 lb 2 oz (88.5 kg)   09/01/16 191 lb (86.6 kg)   10/08/14 185 lb (83.9 kg)   10/02/14 179 lb (81.2 kg)   07/08/14 171 lb (77.6 kg)   01/26/12 172 lb (78 kg)   11/28/11 180 lb (81.6 kg)   10/31/11 176 lb 12.8 oz (80.2 kg)   10/17/11 180 lb (81.6 kg)   09/21/11 179 lb (81.2 kg)       No components found for: HGBA1C  BP Readings from Last 3 Encounters:   11/16/20 107/73   11/03/20 (!) 129/90   08/31/20 122/74     Health Maintenance   Topic Date Due    Varicella vaccine (1 of 2 - 2-dose childhood series) 03/01/1982    Flu vaccine (1) 09/01/2020    A1C test (Diabetic or Prediabetic)  09/19/2021    DTaP/Tdap/Td vaccine (2 - Td) 01/25/2027    Hepatitis C screen  Completed    HIV screen  Completed    Hepatitis A vaccine  Aged Out    Hepatitis B vaccine  Aged Out    Hib vaccine  Aged Out    Meningococcal (ACWY) vaccine  Aged Out    Pneumococcal 0-64 years Vaccine  Aged Out       No components found for: [facility]     PAST MEDICAL HISTORY     Past Medical History:   Diagnosis Date    GERD (gastroesophageal reflux disease)     Gout     Influenza 02/25/2017    Prediabetes 02/2020    Vitamin D deficiency 02/2020     FAMILY HISTORY     Family History   Problem Relation Age of Onset    Alcohol Abuse Father     Stroke Mother     Other Brother         gout    Diabetes Maternal Uncle      SOCIAL HISTORY     Social History     Socioeconomic History    Marital status: Single     Spouse name: Not on file    Number of children: 0    Years of education: Not on file    Highest education level: Not on file   Occupational History    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 states he stopped a couple of days ago   Substance and Sexual Activity    Alcohol use: Not Currently     Comment: binge drinker on weekends    Drug use: Not Currently    Sexual activity: Not on file   Lifestyle    Physical activity     Days per week: Not on file     Minutes per session: Not on file    Stress: Not on file   Relationships    Social connections     Talks on phone: Not on file     Gets together: Not on file     Attends Spiritism service: Not on file     Active member of club or organization: Not on file     Attends meetings of clubs or organizations: Not on file     Relationship status: Not on file    Intimate partner violence     Fear of current or ex partner: Not on file     Emotionally abused: Not on file     Physically abused: Not on file     Forced sexual activity: Not on file   Other Topics Concern    Not on file   Social History Narrative    Not on file SURGICAL HISTORY     Past Surgical History:   Procedure Laterality Date    DENTAL SURGERY      root canal / cracked tooth    OTHER SURGICAL HISTORY      left shoulder - bone spurs    UPPER GASTROINTESTINAL ENDOSCOPY N/A 10/30/2019    EGD BIOPSY performed by Joanna Del Valle DO at [facility] EXTRACTION      age late 25s, in [facility]   (This list may include medications prescribed during this encounter as epic can not insert only the list prior to this encounter.)    ALLERGIES   No Known Allergies  IMMUNIZATIONS     Immunization History   Administered Date(s) Administered    Tdap (Boostrix, Adacel) 01/25/2017     REVIEW OF SYSTEMS   See HPI for further details and pertinent postiives. Negative for the following:  Constitutional: Negative for weight change. Negative for appetite change and fatigue. HENT: Negative for nosebleeds, sore throat, mouth sores, and voice change. Respiratory: Negative for cough, choking and chest tightness. Cardiovascular: Negative for chest pain   Gastrointestinal: See HPI  Musculoskeletal: Negative for arthralgias. Skin: Negative for pallor. Neurological: Negative for weakness and light-headedness. Hematological: Negative for adenopathy. Does not bruise/bleed easily. Psychiatric/Behavioral: Negative for suicidal ideas.    PHYSICAL EXAM   [ INSTRUCTIONS:  \"[x]\" Indicates a positive item  \"[]\" Indicates a negative item  -- DELETE ALL ITEMS NOT EXAMINED]  Vital Signs: (As obtained by patient/caregiver or practitioner observation)    Blood pressure-  Heart rate-    Respiratory rate-    Temperature-  Pulse oximetry-     Constitutional: [x] Appears well-developed and well-nourished [x] No apparent distress      [] Abnormal-   Mental status  [x] Alert and awake  [x] Oriented to person/place/time [x]Able to follow commands      Eyes:  EOM    [x]  Normal  [] Abnormal-  Sclera  [x]  Normal  [] Abnormal -         Discharge [x]  None visible [] Abnormal -    HENT:   [x] Normocephalic, atraumatic. [] Abnormal   [x] Mouth/Throat: Mucous membranes are moist.     External Ears [x] Normal  [] Abnormal-     Neck: [x] No visualized mass     Pulmonary/Chest: [x] Respiratory effort normal.  [] No visualized signs of difficulty breathing or respiratory distress        [] Abnormal-      Musculoskeletal:   [x] Normal gait with no signs of ataxia         [] Normal range of motion of neck        [] Abnormal-       Neurological:        [x] No Facial Asymmetry (Cranial nerve 7 motor function) (limited exam to video visit)          [x] No gaze palsy        [] Abnormal-         Skin:        [x] No significant exanthematous lesions or discoloration noted on facial skin         [] Abnormal-            Psychiatric:       [x] Normal Affect [] No Hallucinations        [] Abnormal-     Other pertinent observable physical exam findings-     RADIOLOGY/PROCEDURES   No results found. FINAL IMPRESSION   Lisbeth Feldman was seen today for follow-up. Diagnoses and all orders for this visit:    Helicobacter pylori gastritis  Status post Pylera therapy with confirmed eradication with negative H. pylori stool antigen on 12/18/2020. May continue omeprazole as needed for GERD    Obesity (BMI 30.0-34. 9)  Counseled on dietary modification and increased exercise. ORDERED FUTURE/PENDING TESTS       FOLLOWUP   Return if symptoms worsen or fail to improve. Tee Madrigal is a 45 y.o. male being evaluated by a Virtual Visit (video visit) encounter to address concerns as mentioned above. A caregiver was present when appropriate. Due to this being a TeleHealth encounter (During [facility] public health emergency), evaluation of the following organ systems was limited: Vitals/Constitutional/EENT/Resp/CV/GI//MS/Neuro/Skin/Heme-Lymph-Imm.   Pursuant to the emergency declaration under the [address] [facility] [address] [address] and Response Supplemental Appropriations Act, this Virtual Visit was conducted with patient's (and/or legal guardian's) consent, to reduce the patient's risk of exposure to COVID-19 and provide necessary medical care. The patient (and/or legal guardian) has also been advised to contact this office for worsening conditions or problems, and seek emergency medical treatment and/or call 911 if deemed necessary.      Patient identification was verified at the start of the visit: Yes    Total time spent on this encounter: 20 minutes           Esvin Lindsay 12/30/20 4:39 PM EST    CC:  Racheal La DO

## 2020-12-31 ENCOUNTER — VIRTUAL VISIT (OUTPATIENT)
Dept: GASTROENTEROLOGY | Age: 40
End: 2020-12-31
Payer: MEDICAID

## 2020-12-31 PROCEDURE — 99242 OFF/OP CONSLTJ NEW/EST SF 20: CPT | Performed by: INTERNAL MEDICINE

## 2020-12-31 NOTE — PATIENT INSTRUCTIONS
Patient Education        Gastritis: Care Instructions  Your Care Instructions     Gastritis is a sore and upset stomach. It happens when something irritates the stomach lining. Many things can cause it. These include an infection such as the flu or something you ate or drank. Medicines or a sore on the lining of the stomach (ulcer) also can cause it. Your belly may bloat and ache. You may belch, vomit, and feel sick to your stomach. You should be able to relieve the problem by taking medicine. And it may help to change your diet. If gastritis lasts, your doctor may prescribe medicine. Follow-up care is a key part of your treatment and safety. Be sure to make and go to all appointments, and call your doctor if you are having problems. It's also a good idea to know your test results and keep a list of the medicines you take. How can you care for yourself at home? · If your doctor prescribed antibiotics, take them as directed. Do not stop taking them just because you feel better. You need to take the full course of antibiotics. · Be safe with medicines. If your doctor prescribed medicine to decrease stomach acid, take it as directed. Call your doctor if you think you are having a problem with your medicine. · Do not take any other medicine, including over-the-counter pain relievers, without talking to your doctor first.  · If your doctor recommends over-the-counter medicine to reduce stomach acid, such as Pepcid AC (famotidine), Prilosec (omeprazole), or Tagamet HB (cimetidine) follow the directions on the label. · Drink plenty of fluids (enough so that your urine is light yellow or clear like water) to prevent dehydration. Choose water and other caffeine-free clear liquids. If you have kidney, heart, or liver disease and have to limit fluids, talk with your doctor before you increase the amount of fluids you drink. · Limit how much alcohol you drink.   · Avoid coffee, tea, cola drinks, chocolate, and other foods with caffeine. They increase stomach acid. When should you call for help? Call 911 anytime you think you may need emergency care. For example, call if:    · You vomit blood or what looks like coffee grounds.     · You pass maroon or very bloody stools. Call your doctor now or seek immediate medical care if:    · You start breathing fast and have not produced urine in the last 8 hours.     · You cannot keep fluids down. Watch closely for changes in your health, and be sure to contact your doctor if:    · You do not get better as expected. Where can you learn more? Go to [email or web address] and sign in to your [email or web address] account. Enter [id number] in the Zenedy box to learn more about \"Gastritis: Care Instructions. \"     If you do not have an account, please click on the \"Sign Up Now\" link. Current as of: April 15, 2020               Content Version: 12.6  © 2006-2020 [employer]. Care instructions adapted under license by [facility] ([facility]). If you have questions about a medical condition or this instruction, always ask your healthcare professional. [place] [address] any warranty or liability for your use of this information.

## 2021-01-21 ENCOUNTER — TELEPHONE (OUTPATIENT)
Dept: FAMILY MEDICINE CLINIC | Age: 41
End: 2021-01-21

## 2021-01-22 ENCOUNTER — VIRTUAL VISIT (OUTPATIENT)
Dept: FAMILY MEDICINE CLINIC | Age: 41
End: 2021-01-22
Payer: MEDICAID

## 2021-01-22 DIAGNOSIS — R10.9 ABDOMINAL PAIN, UNSPECIFIED ABDOMINAL LOCATION: Primary | ICD-10-CM

## 2021-01-22 PROCEDURE — 99213 OFFICE O/P EST LOW 20 MIN: CPT | Performed by: FAMILY MEDICINE

## 2021-01-22 PROCEDURE — G8427 DOCREV CUR MEDS BY ELIG CLIN: HCPCS | Performed by: FAMILY MEDICINE

## 2021-01-22 PROCEDURE — G8417 CALC BMI ABV UP PARAM F/U: HCPCS | Performed by: FAMILY MEDICINE

## 2021-01-22 PROCEDURE — G8484 FLU IMMUNIZE NO ADMIN: HCPCS | Performed by: FAMILY MEDICINE

## 2021-01-22 PROCEDURE — 4004F PT TOBACCO SCREEN RCVD TLK: CPT | Performed by: FAMILY MEDICINE

## 2021-01-22 NOTE — PROGRESS NOTES
[facility]  TELEMEDICINE Progress Note  Padmini Look, DO      The risks and benefits of converting to a virtual visit were discussed in light of the current infectious disease epidemic. Patient also understood that insurance coverage and co-pays are up to their individual insurance plans. Patient identification was verified at the start of the visit. Zia Osorio  1980    01/22/21    Patient location: Home address on file  Provider location: Physician's home    Chief Complaint:   Zia Osorio is a 45 y.o. patient who is here for abd sx        HPI:   3 days of abdominal pain and discomfort. Feels like food gets stuck. Experiencing nausea and increased burping and increased flatus. 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 ROS negative for headache, vision changes, chest pain, shortness of breath,urinary sx, bowel changes. Past medical, surgical, and social history reviewed. Medications and allergies reviewed. No Known Allergies  Prior to Visit Medications    Not on File          Patient-Reported Vitals 1/22/2021   Patient-Reported Weight 214   Patient-Reported Height 5'9\"   Patient-Reported Systolic -      There were no vitals filed for this visit.    Wt Readings from Last 3 Encounters:   11/16/20 219 lb 3.2 oz (99.4 kg)   08/31/20 215 lb (97.5 kg)   02/20/20 215 lb 8 oz (97.8 kg)     BP Readings from Last 3 Encounters:   11/16/20 107/73   11/03/20 (!) 129/90   08/31/20 122/74       Patient Active Problem List   Diagnosis    Gout    Chronic back pain    Impingement syndrome, shoulder    Vitamin D deficiency    Elevated glucose    Prediabetes    Disequilibrium    Subjective tinnitus of both ears       Immunization History   Administered Date(s) Administered    Tdap (Boostrix, Adacel) 01/25/2017       Past Medical History:   Diagnosis Date    GERD (gastroesophageal reflux disease)     Gout     Influenza 02/25/2017    Prediabetes 02/2020  Vitamin D deficiency 02/2020     Past Surgical History:   Procedure Laterality Date    DENTAL SURGERY      root canal / cracked tooth    OTHER SURGICAL HISTORY      left shoulder - bone spurs    UPPER GASTROINTESTINAL ENDOSCOPY N/A 10/30/2019    EGD BIOPSY performed by Dora Oliveira DO at [place] [address] EXTRACTION      age late 25s, in [facility]     Family History   Problem Relation Age of Onset    Alcohol Abuse Father     Stroke Mother     Other Brother         gout    Diabetes Maternal Uncle      Social History     Socioeconomic History    Marital status: Single     Spouse name: Not on file    Number of children: 0    Years of education: Not on file    Highest education level: Not on file   Occupational History    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 states he stopped a couple of days ago   Substance and Sexual Activity    Alcohol use: Not Currently     Comment: binge drinker on weekends    Drug use: Not Currently    Sexual activity: Not on file   Lifestyle    Physical activity     Days per week: Not on file     Minutes per session: Not on file    Stress: Not on file   Relationships    Social connections     Talks on phone: Not on file     Gets together: Not on file     Attends Jain service: Not on file     Active member of club or organization: Not on file     Attends meetings of clubs or organizations: Not on file     Relationship status: Not on file    Intimate partner violence     Fear of current or ex partner: Not on file     Emotionally abused: Not on file Physically abused: Not on file     Forced sexual activity: Not on file   Other Topics Concern    Not on file   Social History Narrative    Not on file                         ASSESSMENT   Diagnosis Orders   1. Abdominal pain, unspecified abdominal location  CT ABDOMEN PELVIS W IV CONTRAST Additional Contrast? Radiologist Recommendation     Check for possible pancreatic cause vs other. PLAN          If applicable, see additional patient information and instructions under \"Patient Instructions. \"    Return in about 3 weeks (around 2/12/2021). There are no Patient Instructions on file for this visit. Please note a portion of this chart was generated using dragon dictation software. Although every effort was made to ensure the accuracy of this automated transcription, some errors in transcription may have occurred. Pursuant to the emergency declaration under the [facility], [facility]5 waiver authority and the [employer] and [employer] Act, this Virtual  Visit was conducted, with patient's consent, to reduce the patient's risk of exposure to COVID-19 and provide continuity of care for an established patient. Services were provided through a video synchronous discussion virtually to substitute for in-person clinic visit. Patient was instructed that the AVS is available on My Chart or was emailed to the patient if not on My Chart. Lab orders were emailed to patient if they do not use a [facility] lab. Any work notes were sent to patient through My Chart or email.

## 2021-02-03 ENCOUNTER — TELEPHONE (OUTPATIENT)
Dept: ADMINISTRATIVE | Age: 41
End: 2021-02-03

## 2021-02-12 ENCOUNTER — VIRTUAL VISIT (OUTPATIENT)
Dept: FAMILY MEDICINE CLINIC | Age: 41
End: 2021-02-12
Payer: MEDICAID

## 2021-02-12 DIAGNOSIS — R10.9 ABDOMINAL PAIN, UNSPECIFIED ABDOMINAL LOCATION: Primary | ICD-10-CM

## 2021-02-12 DIAGNOSIS — K21.9 GASTROESOPHAGEAL REFLUX DISEASE WITHOUT ESOPHAGITIS: ICD-10-CM

## 2021-02-12 PROCEDURE — 99442 PR PHYS/QHP TELEPHONE EVALUATION 11-20 MIN: CPT | Performed by: FAMILY MEDICINE

## 2021-02-12 RX ORDER — RABEPRAZOLE SODIUM 20 MG/1
20 TABLET, DELAYED RELEASE ORAL DAILY
Qty: 30 TABLET | Refills: 3 | Status: SHIPPED | OUTPATIENT
Start: 2021-02-12 | End: 2021-03-29 | Stop reason: SDUPTHER

## 2021-02-12 SDOH — ECONOMIC STABILITY: TRANSPORTATION INSECURITY
IN THE PAST 12 MONTHS, HAS LACK OF TRANSPORTATION KEPT YOU FROM MEETINGS, WORK, OR FROM GETTING THINGS NEEDED FOR DAILY LIVING?: NO

## 2021-02-12 SDOH — ECONOMIC STABILITY: FOOD INSECURITY: WITHIN THE PAST 12 MONTHS, YOU WORRIED THAT YOUR FOOD WOULD RUN OUT BEFORE YOU GOT MONEY TO BUY MORE.: NEVER TRUE

## 2021-02-12 SDOH — ECONOMIC STABILITY: INCOME INSECURITY: HOW HARD IS IT FOR YOU TO PAY FOR THE VERY BASICS LIKE FOOD, HOUSING, MEDICAL CARE, AND HEATING?: NOT HARD AT ALL

## 2021-02-12 ASSESSMENT — PATIENT HEALTH QUESTIONNAIRE - PHQ9
SUM OF ALL RESPONSES TO PHQ QUESTIONS 1-9: 0
2. FEELING DOWN, DEPRESSED OR HOPELESS: 0
SUM OF ALL RESPONSES TO PHQ9 QUESTIONS 1 & 2: 0

## 2021-02-12 NOTE — PROGRESS NOTES
[facility]  TELEMEDICINE Progress Note  Padmini Look, DO      The risks and benefits of converting to a virtual visit were discussed in light of the current infectious disease epidemic. Patient also understood that insurance coverage and co-pays are up to their individual insurance plans. Patient identification was verified at the start of the visit. Zia Osorio  1980    02/12/21    Patient location: Home address on file  Provider location: Physician's home    Chief Complaint:   Zia Osorio is a 45 y.o. patient who is here for abd sx        HPI:   Insurance denied CT scan. Stopped pantoprazole. Continues chewing tobacco.  Feels fine in morning. ROS negative for headache, vision changes, chest pain, shortness of breath, abdominal pain, urinary sx, bowel changes. Past medical, surgical, and social history reviewed. Medications and allergies reviewed. No Known Allergies  Prior to Visit Medications    Medication Sig Taking? Authorizing Provider   RABEprazole (ACIPHEX) 20 MG tablet Take 1 tablet by mouth daily Yes Gleen Feeling Bingcang, DO          Patient-Reported Vitals 2/12/2021   Patient-Reported Weight 210   Patient-Reported Height 5'8\"   Patient-Reported Systolic -      There were no vitals filed for this visit.    Wt Readings from Last 3 Encounters:   11/16/20 219 lb 3.2 oz (99.4 kg)   08/31/20 215 lb (97.5 kg)   02/20/20 215 lb 8 oz (97.8 kg)     BP Readings from Last 3 Encounters:   11/16/20 107/73   11/03/20 (!) 129/90   08/31/20 122/74       Patient Active Problem List   Diagnosis    Gout    Chronic back pain    Impingement syndrome, shoulder    Vitamin D deficiency    Elevated glucose    Prediabetes    Disequilibrium    Subjective tinnitus of both ears       Immunization History   Administered Date(s) Administered    Tdap (Boostrix, Adacel) 01/25/2017       Past Medical History:   Diagnosis Date    GERD (gastroesophageal reflux disease)     Gout  Influenza 02/25/2017    Prediabetes 02/2020    Vitamin D deficiency 02/2020     Past Surgical History:   Procedure Laterality Date    DENTAL SURGERY      root canal / cracked tooth    OTHER SURGICAL HISTORY      left shoulder - bone spurs    UPPER GASTROINTESTINAL ENDOSCOPY N/A 10/30/2019    EGD BIOPSY performed by Tara Nelson DO at [facility] EXTRACTION      age late 25s, in [facility]     Family History   Problem Relation Age of Onset    Alcohol Abuse Father     Stroke Mother     Other Brother         gout    Diabetes Maternal Uncle      Social History     Socioeconomic History    Marital status: Single     Spouse name: Not on file    Number of children: 0    Years of education: Not on file    Highest education level: Not on file   Occupational History    Occupation: [employer]    Social Needs    Financial resource strain: Not hard at all   [employer] insecurity     Worry: Never true     Inability: Not on file    Transportation needs     Medical: No     Non-medical: No   Tobacco Use    Smoking status: Former Smoker     Years: 5.00     Types: Cigarettes     Quit date: 9/21/2010     Years since quitting: 10.4    Smokeless tobacco: Current User     Types: Chew    Tobacco comment: pt. states he stopped a couple of days ago   Substance and Sexual Activity    Alcohol use: Not Currently     Comment: binge drinker on weekends    Drug use: Not Currently    Sexual activity: Not on file   Lifestyle    Physical activity     Days per week: Not on file     Minutes per session: Not on file    Stress: Not on file   Relationships    Social connections     Talks on phone: Not on file     Gets together: Not on file     Attends Jew service: Not on file     Active member of club or organization: Not on file     Attends meetings of clubs or organizations: Not on file     Relationship status: Not on file    Intimate partner violence     Fear of current or ex partner: Not on file Emotionally abused: Not on file     Physically abused: Not on file     Forced sexual activity: Not on file   Other Topics Concern    Not on file   Social History Narrative    Not on file       O: There were no vitals taken for this visit. Physical Exam  PHYSICAL EXAMINATION:  [ INSTRUCTIONS:  \"[x]\" Indicates a positive item  \"[]\" Indicates a negative item  -- DELETE ALL ITEMS NOT EXAMINED]  Vital Signs: (As obtained by patient/caregiver or practitioner observation)    Constitutional: [x] Appears well-developed and well-nourished [x] No apparent distress      [] Abnormal-   Mental status  [x] Alert and awake  [x] Oriented to person/place/time [x]Able to follow commands      Eyes:  EOM    [x]  Normal  [] Abnormal-  Sclera  [x]  Normal  [] Abnormal -         Discharge [x]  None visible  [] Abnormal -    HENT:   [x] Normocephalic, atraumatic. [] Abnormal   [] Mouth/Throat: Mucous membranes are moist.     External Ears [x] Normal  [] Abnormal-     Neck: [x] No visualized mass     Pulmonary/Chest: [x] Respiratory effort normal.  [x] No visualized signs of difficulty breathing or respiratory distress        [] Abnormal-      Musculoskeletal:   [] Normal gait with no signs of ataxia         [x] Normal range of motion of neck        [] Abnormal-       Neurological:        [x] No Facial Asymmetry (Cranial nerve 7 motor function) (limited exam to video visit)          [x] No gaze palsy        [] Abnormal-         Skin:        [x] No significant exanthematous lesions or discoloration noted on facial skin         [] Abnormal-            Psychiatric:       [x] Normal Affect [] No Hallucinations        [] Abnormal-     Other pertinent observable physical exam findings-     Due to this being a TeleHealth encounter, evaluation of the following organ systems is limited: Vitals/Constitutional/EENT/Resp/CV/GI//MS/Neuro/Skin/Heme-Lymph-Imm.         ASSESSMENT   Diagnosis Orders 1. Abdominal pain, unspecified abdominal location     2. Gastroesophageal reflux disease without esophagitis  RABEprazole (ACIPHEX) 20 MG tablet     The risks, benefits, potential side effects and barriers to medication use were addressed today. Understanding was acknowledged. Patient asked to follow-up if condition(s) do not improve as anticipated. He agrees to f[facility] with [facility]. He will call. Question of dysfunctional LES? PLAN          If applicable, see additional patient information and instructions under \"Patient Instructions. \"    Return if symptoms worsen or fail to improve. There are no Patient Instructions on file for this visit. Please note a portion of this chart was generated using dragon dictation software. Although every effort was made to ensure the accuracy of this automated transcription, some errors in transcription may have occurred. Pursuant to the emergency declaration under the [facility], [facility] waiver authority and the [employer] and [employer] Act, this Virtual  Visit was conducted, with patient's consent, to reduce the patient's risk of exposure to COVID-19 and provide continuity of care for an established patient. Services were provided through a video synchronous discussion virtually to substitute for in-person clinic visit. Patient was instructed that the AVS is available on My Chart or was emailed to the patient if not on My Chart. Lab orders were emailed to patient if they do not use a [facility] lab. Any work notes were sent to patient through My Chart or email.

## 2021-02-15 ENCOUNTER — TELEPHONE (OUTPATIENT)
Dept: FAMILY MEDICINE CLINIC | Age: 41
End: 2021-02-15

## 2021-02-15 NOTE — TELEPHONE ENCOUNTER
Office has been notified that pt is requiring Prior Authorization for the following medication:  Rabeprazole Sodium 20 mg DR Tablets    Please initiate this request through CoverMyMeds, contacting the following Payor/Insurance:  Serg Christiansen    Please see below, or the documentation attached to this encounter for any additional information that may assist in processing PA:  --     Thank you!

## 2021-02-15 NOTE — TELEPHONE ENCOUNTER
PA submitted via [facility] for RABEprazole Sodium 20MG dr tablets.   (Key: [id number])     STATUS: PENDING

## 2021-02-16 NOTE — TELEPHONE ENCOUNTER
Received APPROVAL for RABEprazole Sodium 20MG dr tablets from 02/15/2021 through 02/15/2022. Approval letter attached. Please advise patient. Thank you.

## 2021-03-19 ENCOUNTER — TELEPHONE (OUTPATIENT)
Dept: FAMILY MEDICINE CLINIC | Age: 41
End: 2021-03-19

## 2021-03-19 NOTE — TELEPHONE ENCOUNTER
----- Message from Chinedu Blood sent at 3/19/2021  4:09 PM EDT -----  Subject: Message to Provider    QUESTIONS  Information for Provider? Pt still having acid reflux   random sharp for half a second   headaches and minor chest/abs pains-mentioned losing balance/dizziness. ---------------------------------------------------------------------------  --------------  [place] INFO  What is the best way for the office to contact you? OK to leave message on   voicemail  Preferred Call Back Phone Number? [phone]  ---------------------------------------------------------------------------  --------------  SCRIPT ANSWERS  Relationship to Patient? Self  Appointment reason? Symptomatic  Select script based on patient symptoms? Adult Chest Pain [Angina   MI   Heart Attack]   Are you currently having chest pain? No  Has your pain started or worsened within the past 3 days? No  Are you having difficulty breathing? No  Have you previously seen a provider for this pain?  Yes

## 2021-03-19 NOTE — TELEPHONE ENCOUNTER
Pt woke up at 5am today and ate left over [place] food. He went back to sleep and since getting up for work, he has had intermittent issues w/ acid reflux , loss of balance and right side chest pain (sharp, shooting), no SOB that last 1-2 seconds, which that started at 245pm today. He is eating and drinking water but it is not helping. Pt has not started the aciphex that has been prescribed. States he can pick it up on Monday.     Please advise

## 2021-03-26 ENCOUNTER — TELEPHONE (OUTPATIENT)
Dept: FAMILY MEDICINE CLINIC | Age: 41
End: 2021-03-26

## 2021-03-29 ENCOUNTER — TELEPHONE (OUTPATIENT)
Dept: FAMILY MEDICINE CLINIC | Age: 41
End: 2021-03-29

## 2021-03-29 ENCOUNTER — VIRTUAL VISIT (OUTPATIENT)
Dept: FAMILY MEDICINE CLINIC | Age: 41
End: 2021-03-29
Payer: MEDICAID

## 2021-03-29 DIAGNOSIS — K21.9 GASTROESOPHAGEAL REFLUX DISEASE WITHOUT ESOPHAGITIS: Primary | ICD-10-CM

## 2021-03-29 PROCEDURE — 99213 OFFICE O/P EST LOW 20 MIN: CPT | Performed by: FAMILY MEDICINE

## 2021-03-29 PROCEDURE — G8427 DOCREV CUR MEDS BY ELIG CLIN: HCPCS | Performed by: FAMILY MEDICINE

## 2021-03-29 RX ORDER — RABEPRAZOLE SODIUM 20 MG/1
20 TABLET, DELAYED RELEASE ORAL 2 TIMES DAILY
Qty: 60 TABLET | Refills: 3 | Status: SHIPPED | OUTPATIENT
Start: 2021-03-29 | End: 2021-04-06 | Stop reason: ALTCHOICE

## 2021-03-29 NOTE — PATIENT INSTRUCTIONS
CALL TO MAKE APPT for covid vaccination. CALL [facility] TO DOUBLE CHECK IF THEY ARE OR NOT COVERING COST OF CT SCAN. IF THEY ARE COVERING IT THEN CALL [address] LINE [id number]. IF THEY ARE NOT COVERING IT, TELL THEM TO FAX THE PAPERWORK TO SACHIN ALBARRAN DOUBLE UP ON ACIPHEX. [facility]) COVID-19 Vaccination Sites  Patients call [phone] to schedule for a COVID-19 vaccine 8 am  5 pm (weekdays)  [facility] ([address])  [facility] ([address] [place] [address] Street)  [facility] ([address] [place] [address] [id number])  [address] at [employer] ([address] [place] [address])  [facility] MENTAL [facility] T[facility]ATMENT FACILITY Elías Miller, use Behavioral Health Entrance next to the Emergency Room Main entrance    Patient can use Screen[employer] or visit https://[email or web address] coronavirus. [place].gov to check your eligibility for the vaccine and to book an appointment at a mass vaccination site or to get a link to other vaccine providers.

## 2021-03-29 NOTE — TELEPHONE ENCOUNTER
Pt called, stating that his insurance did not approve the CT Abdominal Pelvis W IV Contrast because the order is missing notes that needs to be added on by . He states that [employer] stated that the doctor knows what notes need to be placed on the order. His order needs some missing notes. Pt would like a call back once the order is re-sent. Please advise. Thanks.

## 2021-03-29 NOTE — PROGRESS NOTES
[facility]  TELEMEDICINE Progress Note  Александр Vazquez DO      The risks and benefits of converting to a virtual visit were discussed in light of the current infectious disease epidemic. Patient also understood that insurance coverage and co-pays are up to their individual insurance plans. Patient identification was verified at the start of the visit. Victor M Zuniga  1980    03/29/21    Patient location: Home address on file  Provider location: Physician's home    Chief Complaint:   Victor M Zuniga is a 37 y.o. patient who is here for DIGESTIVE SX. HPI:      aciphex helps, other meds not helping, stomach discomfort, HA, feels like going to pass out, pressure in stomach. Has not yet completed CT scan of abdomen/pelvis. H pylori test Dec 21, 2020 was normal.    ROS negative for headache, vision changes, chest pain, shortness of breath,  urinary sx, bowel changes. Past medical, surgical, and social history reviewed. Medications and allergies reviewed. No Known Allergies  Prior to Visit Medications    Medication Sig Taking? Authorizing Provider   RABEprazole (ACIPHEX) 20 MG tablet Take 1 tablet by mouth 2 times daily Yes Margie Cabello DO          Patient-Reported Vitals 3/29/2021   Patient-Reported Weight 214 lb   Patient-Reported Height 5 ft 8 in   Patient-Reported Systolic -      There were no vitals filed for this visit.    Wt Readings from Last 3 Encounters:   11/16/20 219 lb 3.2 oz (99.4 kg)   08/31/20 215 lb (97.5 kg)   02/20/20 215 lb 8 oz (97.8 kg)     BP Readings from Last 3 Encounters:   11/16/20 107/73   11/03/20 (!) 129/90   08/31/20 122/74       Patient Active Problem List   Diagnosis    Gout    Chronic back pain    Impingement syndrome, shoulder    Vitamin D deficiency    Elevated glucose    Prediabetes    Disequilibrium    Subjective tinnitus of both ears       Immunization History   Administered Date(s) Administered    Tdap (Boostrix, Adacel) 01/25/2017       Past Medical History:   Diagnosis Date    GERD (gastroesophageal reflux disease)     Gout     Influenza 02/25/2017    Prediabetes 02/2020    Vitamin D deficiency 02/2020     Past Surgical History:   Procedure Laterality Date    DENTAL SURGERY      root canal / cracked tooth    OTHER SURGICAL HISTORY      left shoulder - bone spurs    UPPER GASTROINTESTINAL ENDOSCOPY N/A 10/30/2019    EGD BIOPSY performed by Stephenie Ellis DO at [facility] EXTRACTION      age late 25s, in [facility]     Family History   Problem Relation Age of Onset    Alcohol Abuse Father     Stroke Mother     Other Brother         gout    Diabetes Maternal Uncle      Social History     Socioeconomic History    Marital status: Single     Spouse name: Not on file    Number of children: 0    Years of education: Not on file    Highest education level: Not on file   Occupational History    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 states he stopped a couple of days ago   Substance and Sexual Activity    Alcohol use: Not Currently     Comment: binge drinker on weekends    Drug use: Not Currently    Sexual activity: Not on file   Lifestyle    Physical activity     Days per week: Not on file     Minutes per session: Not on file    Stress: Not on file   Relationships    Social connections     Talks on phone: Not on file     Gets together: Not on file     Attends Jewish service: Not on file     Active member of club or organization: Not on file     Attends meetings of clubs or organizations: Not on file Relationship status: Not on file    Intimate partner violence     Fear of current or ex partner: Not on file     Emotionally abused: Not on file     Physically abused: Not on file     Forced sexual activity: Not on file   Other Topics Concern    Not on file   Social History Narrative    Not on file       O: There were no vitals taken for this visit. Physical Exam  PHYSICAL EXAMINATION:  [ INSTRUCTIONS:  \"[x]\" Indicates a positive item  \"[]\" Indicates a negative item  -- DELETE ALL ITEMS NOT EXAMINED]  Vital Signs: (As obtained by patient/caregiver or practitioner observation)    Constitutional: [x] Appears well-developed and well-nourished [x] No apparent distress      [] Abnormal-   Mental status  [x] Alert and awake  [x] Oriented to person/place/time [x]Able to follow commands      Eyes:  EOM    [x]  Normal  [] Abnormal-  Sclera  [x]  Normal  [] Abnormal -         Discharge [x]  None visible  [] Abnormal -    HENT:   [x] Normocephalic, atraumatic.   [] Abnormal   [] Mouth/Throat: Mucous membranes are moist.     External Ears [x] Normal  [] Abnormal-     Neck: [x] No visualized mass     Pulmonary/Chest: [x] Respiratory effort normal.  [x] No visualized signs of difficulty breathing or respiratory distress        [] Abnormal-      Musculoskeletal:   [] Normal gait with no signs of ataxia         [x] Normal range of motion of neck        [] Abnormal-       Neurological:        [x] No Facial Asymmetry (Cranial nerve 7 motor function) (limited exam to video visit)          [x] No gaze palsy        [] Abnormal-         Skin:        [x] No significant exanthematous lesions or discoloration noted on facial skin         [] Abnormal-            Psychiatric:       [x] Normal Affect [] No Hallucinations        [] Abnormal-     Other pertinent observable physical exam findings- n/a    Due to this being a TeleHealth encounter, evaluation of the following organ systems is limited: Vitals/Constitutional/EENT/Resp/CV/GI//MS/Neuro/Skin/Heme-Lymph-Imm. ASSESSMENT   Diagnosis Orders   1. Gastroesophageal reflux disease without esophagitis  RABEprazole (ACIPHEX) 20 MG tablet       See plan about increasing dose of Aciphex    PLAN          Time spent on encounter (to include any same day medical record review): 22 minutes  Established E/M: 10-19 (08187), 20-29 (30398), 30-39 (91787), 40-54 (68645)   New E/M: 15-29 (69729), 30-44 (82929), 45-59 (98215), 60-74 (86301)  Telephone E/M: 5-10 (55833), 11-20 (39395), 21-30 (61944)    If applicable, see additional patient information and instructions under \"Patient Instructions. \"    Return if symptoms worsen or fail to improve. Patient Instructions   CALL TO MAKE APPT for covid vaccination. CALL LOUISA TO DOUBLE CHECK IF THEY ARE OR NOT COVERING COST OF CT SCAN. IF THEY ARE COVERING IT THEN CALL [address] LINE [address]. IF THEY ARE NOT COVERING IT, TELL THEM TO FAX THE PAPERWORK TO SACHIN ALBARRAN DOUBLE UP ON ACIPHEX. [facility]) COVID-19 Vaccination Sites  Patients call [phone] to schedule for a COVID-19 vaccine 8 am  5 pm (weekdays)  [facility] [address] ([address])  [facility] ([address])  [facility] ([address])  [facility] at [facility] ([address] [place] [address])  [facility] Elías Ross, use Behavioral Health Entrance next to the Emergency Room Main entrance    Patient can use [email or web address] or visit https://[email or web address] [email or web address] [place].gov to check your eligibility for the vaccine and to book an appointment at a mass vaccination site or to get a link to other vaccine providers. Please note a portion of this chart was generated using dragon dictation software. Although every effort was made to ensure the accuracy of this automated transcription, some errors in transcription may have occurred.       Pursuant to the emergency declaration under the [facility], [facility]5 waiver authority and the [employer] and [employer] Act, this Virtual  Visit was conducted, with patient's consent, to reduce the patient's risk of exposure to COVID-19 and provide continuity of care for an established patient. Services were provided through a video synchronous discussion virtually to substitute for in-person clinic visit. Patient was instructed that the AVS is available on My Chart or was emailed to the patient if not on My Chart. Lab orders were emailed to patient if they do not use a [id number] [facility] lab. Any work notes were sent to patient through My Chart or email.

## 2021-03-30 NOTE — TELEPHONE ENCOUNTER
I will think I have much else to add to the progress note from March 29.     It would be more useful if Travis Jurado could get evaluated in the clinic for an actual physical exam.    Offer in person appointment

## 2021-04-01 ENCOUNTER — HOSPITAL ENCOUNTER (EMERGENCY)
Age: 41
Discharge: HOME | End: 2021-04-01
Payer: MEDICAID

## 2021-04-01 VITALS
TEMPERATURE: 99 F | RESPIRATION RATE: 18 BRPM | OXYGEN SATURATION: 98 % | SYSTOLIC BLOOD PRESSURE: 154 MMHG | HEIGHT: 68 IN | DIASTOLIC BLOOD PRESSURE: 99 MMHG | HEART RATE: 86 BPM | WEIGHT: 220 LBS | BODY MASS INDEX: 33.34 KG/M2

## 2021-04-01 DIAGNOSIS — M10.072 ACUTE IDIOPATHIC GOUT OF LEFT FOOT: Primary | ICD-10-CM

## 2021-04-01 PROCEDURE — 6370000000 HC RX 637 (ALT 250 FOR IP): Performed by: PHYSICIAN ASSISTANT

## 2021-04-01 PROCEDURE — 99283 EMERGENCY DEPT VISIT LOW MDM: CPT

## 2021-04-01 RX ORDER — HYDROCODONE BITARTRATE AND ACETAMINOPHEN 5; 325 MG/1; MG/1
1 TABLET ORAL ONCE
Status: COMPLETED | OUTPATIENT
Start: 2021-04-01 | End: 2021-04-01

## 2021-04-01 RX ORDER — PREDNISONE 20 MG/1
60 TABLET ORAL ONCE
Status: COMPLETED | OUTPATIENT
Start: 2021-04-01 | End: 2021-04-01

## 2021-04-01 RX ORDER — PREDNISONE 10 MG/1
TABLET ORAL
Qty: 30 TABLET | Refills: 0 | Status: SHIPPED | OUTPATIENT
Start: 2021-04-01 | End: 2021-04-06 | Stop reason: ALTCHOICE

## 2021-04-01 RX ORDER — HYDROCODONE BITARTRATE AND ACETAMINOPHEN 5; 325 MG/1; MG/1
1 TABLET ORAL EVERY 6 HOURS PRN
Qty: 8 TABLET | Refills: 0 | Status: SHIPPED | OUTPATIENT
Start: 2021-04-01 | End: 2021-04-03

## 2021-04-01 RX ADMIN — PREDNISONE 60 MG: 20 TABLET ORAL at 08:54

## 2021-04-01 RX ADMIN — HYDROCODONE BITARTRATE AND ACETAMINOPHEN 1 TABLET: 5; 325 TABLET ORAL at 08:54

## 2021-04-01 ASSESSMENT — PAIN DESCRIPTION - DESCRIPTORS: DESCRIPTORS: BURNING;ACHING

## 2021-04-01 ASSESSMENT — ENCOUNTER SYMPTOMS
VOMITING: 0
SHORTNESS OF BREATH: 0
DIARRHEA: 0
CHEST TIGHTNESS: 0
NAUSEA: 0
ABDOMINAL PAIN: 0

## 2021-04-01 ASSESSMENT — PAIN DESCRIPTION - FREQUENCY: FREQUENCY: CONTINUOUS

## 2021-04-01 ASSESSMENT — PAIN DESCRIPTION - PROGRESSION: CLINICAL_PROGRESSION: GRADUALLY WORSENING

## 2021-04-01 ASSESSMENT — PAIN DESCRIPTION - LOCATION: LOCATION: FOOT

## 2021-04-01 NOTE — ED PROVIDER NOTES
activity change, chills and fever. Respiratory: Negative for chest tightness and shortness of breath. Cardiovascular: Negative for chest pain. Gastrointestinal: Negative for abdominal pain, diarrhea, nausea and vomiting. Genitourinary: Negative for dysuria and flank pain. Musculoskeletal: Positive for arthralgias, gait problem and joint swelling. Skin: Negative for wound. Positives and Pertinent negatives as per HPI. Except as noted above in the ROS, all other systems were reviewed and negative. PAST MEDICAL HISTORY     Past Medical History:   Diagnosis Date    GERD (gastroesophageal reflux disease)     Gout     Influenza 02/25/2017    Prediabetes 02/2020    Vitamin D deficiency 02/2020         SURGICAL HISTORY     Past Surgical History:   Procedure Laterality Date    DENTAL SURGERY      root canal / cracked tooth    OTHER SURGICAL HISTORY      left shoulder - bone spurs    UPPER GASTROINTESTINAL ENDOSCOPY N/A 10/30/2019    EGD BIOPSY performed by Mejia Craven DO at [address] EXTRA[address]      age late 25s, in [facility]       Previous Medications    RABEPRAZOLE (ACIPHEX) 20 MG TABLET    Take 1 tablet by mouth 2 times daily         ALLERGIES     Patient has no known allergies.     FAMILYHISTORY       Family History   Problem Relation Age of Onset    Alcohol Abuse Father     Stroke Mother     Other Brother         gout    Diabetes Maternal Uncle           SOCIAL HISTORY       Social History     Tobacco Use    Smoking status: Former Smoker     Years: 5.00     Types: Cigarettes     Quit date: 9/21/2010     Years since quitting: 10.5    Smokeless tobacco: Current User     Types: Chew    Tobacco comment: pt. states he stopped a couple of days ago   Substance Use Topics    Alcohol use: Not Currently     Comment: binge drinker on weekends    Drug use: Not Currently       SCREENINGS             PHYSICAL EXAM    (up to 7 for level 4, 8 or more for level 5)     ED Triage Vitals [04/01/21 0815]   BP Temp Temp Source Pulse Resp SpO2 Height Weight   (!) 154/99 99 °F (37.2 °C) Oral 86 18 98 % 5' 8\" (1.727 m) 220 lb (99.8 kg)       Physical Exam  Vitals signs and nursing note reviewed. Constitutional:       General: He is not in acute distress. Appearance: Normal appearance. He is well-developed. He is not diaphoretic. HENT:      Head: Normocephalic and atraumatic. Right Ear: External ear normal.      Left Ear: External ear normal.   Eyes:      General: No scleral icterus. Right eye: No discharge. Left eye: No discharge. Conjunctiva/sclera: Conjunctivae normal.   Neck:      Musculoskeletal: Normal range of motion. Vascular: No JVD. Cardiovascular:      Rate and Rhythm: Normal rate and regular rhythm. Heart sounds: No murmur. No friction rub. No gallop. Pulmonary:      Effort: Pulmonary effort is normal. No accessory muscle usage or respiratory distress. Breath sounds: Normal breath sounds. No wheezing, rhonchi or rales. Abdominal:      General: There is no distension. Palpations: Abdomen is soft. Abdomen is not rigid. There is no mass. Tenderness: There is no abdominal tenderness. There is no guarding or rebound. Musculoskeletal:        Feet:    Skin:     General: Skin is warm and dry. Neurological:      Mental Status: He is alert and oriented to person, place, and time. GCS: GCS eye subscore is 4. GCS verbal subscore is 5. GCS motor subscore is 6. Cranial Nerves: No cranial nerve deficit. Sensory: No sensory deficit. Coordination: Coordination normal.   Psychiatric:         Behavior: Behavior normal.         DIAGNOSTIC RESULTS   LABS:    Labs Reviewed - No data to display    All other labs were within normal range or not returned as of this dictation. EKG:  All EKG's are interpreted by the Emergency Department Physician in the absence of a cardiologist.  Please see their note for interpretation of EKG. RADIOLOGY:   Non-plain film images such as CT, Ultrasound and MRI are read by the radiologist. Plain radiographic images are visualized and preliminarily interpreted by the ED Provider with the below findings:        Interpretation per the Radiologist below, if available at the time of this note:    No orders to display     No results found. PROCEDURES   Unless otherwise noted below, none     Procedures    CRITICAL CARE TIME   N/A    CONSULTS:  None      EMERGENCY DEPARTMENT COURSE and DIFFERENTIAL DIAGNOSIS/MDM:   Vitals:    Vitals:    04/01/21 0815   BP: (!) 154/99   Pulse: 86   Resp: 18   Temp: 99 °F (37.2 °C)   TempSrc: Oral   SpO2: 98%   Weight: 220 lb (99.8 kg)   Height: 5' 8\" (1.727 m)       Patient was given the following medications:  Medications   predniSONE (DELTASONE) tablet 60 mg (has no administration in time range)   HYDROcodone-acetaminophen (NORCO) 5-325 MG per tablet 1 tablet (has no administration in time range)           The patient's detailed history of present illness is documented as above. Upon arrival to the emergency department the patient's vital signs are as documented. The patient is noted to be hemodynamically stable and afebrile. Physical examination findings are as above. Lengthy discussion was had with the patient in regards the above-mentioned. He does not like and/or want to take the indomethacin secondary to the above mention difficulties with GI side effects. I have discussed with him utilization of prednisone. I have also discussed narcotic pain medication for pain control and crutches to offload the foot until he is feeling better. Strict potential instructions for return. The patient has been made aware of the signs and symptoms which would necessitate an immediate return to the emergency department and verbalizes an understanding of these signs and symptoms.     I estimate there is low risk for compartment syndrome, deep venous thrombosis, limb-threatening infectious, tendon and/or neurovascular injury and therefore I consider the discharge disposition reasonable. Bridget Kimbrough and I have discussed the diagnosis and risks, and we agree with discharging home to follow-up with their primary care provider and/or the referring orthopedist on call. We also discussed returning to the emergency department immediately if new or worsening symptoms occur. We have discussed the symptoms which are most concerning (e.g., changing or worsening pain, numbness, weakness) that necessitate immediate return. FINAL IMPRESSION      1. Acute idiopathic gout of left foot          DISPOSITION/PLAN   DISPOSITION Decision To Discharge 04/01/2021 08:26:27 AM      PATIENT REFERREDTO:  DO Marge Pierson 19  [address]  [phone]    Schedule an appointment as soon as possible for a visit       [facility] Emergency Department  [address] [place] [address]  [phone]    If symptoms worsen      DISCHARGE MEDICATIONS:  New Prescriptions    HYDROCODONE-ACETAMINOPHEN (NORCO) 5-325 MG PER TABLET    Take 1 tablet by mouth every 6 hours as needed for Pain for up to 2 days.     PREDNISONE (DELTASONE) 10 MG TABLET    5 tabs po qam for 2 days then 4,3,2,1 tabs qam for 2 days each total of 10 days       DISCONTINUED MEDICATIONS:  Discontinued Medications    No medications on file              (Please note that portions of this note were completed with a voice recognition program.  Efforts were made to edit the dictations but occasionally words are mis-transcribed.)    Marylen Amend, PA-C (electronically signed)           Buddy Black PA-C  04/01/21 5820

## 2021-04-05 ENCOUNTER — VIRTUAL VISIT (OUTPATIENT)
Dept: FAMILY MEDICINE CLINIC | Age: 41
End: 2021-04-05
Payer: MEDICAID

## 2021-04-05 DIAGNOSIS — K21.9 GASTROESOPHAGEAL REFLUX DISEASE WITHOUT ESOPHAGITIS: Primary | ICD-10-CM

## 2021-04-05 PROCEDURE — 99442 PR PHYS/QHP TELEPHONE EVALUATION 11-20 MIN: CPT | Performed by: FAMILY MEDICINE

## 2021-04-05 NOTE — PATIENT INSTRUCTIONS
Take Aciphex 30 min before breakfast and again 30 min before dinner. Take prednisone every 24 hours with the meal.    Keep April 8 appt.

## 2021-04-05 NOTE — PROGRESS NOTES
[facility]  TELEPHONE Progress Note  Rivka Todd DO      The risks and benefits of converting to a virtual visit were discussed in light of the current infectious disease epidemic. Patient also understood that insurance coverage and co-pays are up to their individual insurance plans. Patient identification was verified at the start of the visit. Seema Marroquin  1980    04/06/21    Patient location: Home address on file  Provider location: Physician's home    Chief Complaint:   Seema Marroquin is a 37 y.o. patient who is here for sharp pain in the mid back        HPI:   sharp pain in mid-back. feels like something is stuck and when drinking it feels something goes down, and burping helps with pain. onset today. Had gout and prescribed prednisone from ER team.        ROS negative for headache, vision changes, chest pain, shortness of breath, abdominal pain, urinary sx, bowel changes. Past medical, surgical, and social history reviewed. Medications and allergies reviewed. No Known Allergies  Prior to Visit Medications    Not on File          Patient-Reported Vitals 3/29/2021   Patient-Reported Weight 214 lb   Patient-Reported Height 5 ft 8 in   Patient-Reported Systolic -      There were no vitals filed for this visit.    Wt Readings from Last 3 Encounters:   04/01/21 220 lb (99.8 kg)   11/16/20 219 lb 3.2 oz (99.4 kg)   08/31/20 215 lb (97.5 kg)     BP Readings from Last 3 Encounters:   04/01/21 (!) 154/99   11/16/20 107/73   11/03/20 (!) 129/90       Patient Active Problem List   Diagnosis    Gout    Chronic back pain    Impingement syndrome, shoulder    Vitamin D deficiency    Elevated glucose    Prediabetes    Disequilibrium    Subjective tinnitus of both ears       Immunization History   Administered Date(s) Administered    Tdap (Boostrix, Adacel) 01/25/2017       Past Medical History:   Diagnosis Date    GERD (gastroesophageal reflux disease)     Gout     Influenza 02/25/2017    Prediabetes 02/2020    Vitamin D deficiency 02/2020     Past Surgical History:   Procedure Laterality Date    DENTAL SURGERY      root canal / cracked tooth    OTHER SURGICAL HISTORY      left shoulder - bone spurs    UPPER GASTROINTESTINAL ENDOSCOPY N/A 10/30/2019    EGD BIOPSY performed by Hilary Berrios DO at [address] EXTRACTION      age late 25s, in [facility]     Family History   Problem Relation Age of Onset    Alcohol Abuse Father     Stroke Mother     Other Brother         gout    Diabetes Maternal Uncle      Social History     Socioeconomic History    Marital status: Single     Spouse name: Not on file    Number of children: 0    Years of education: Not on file    Highest education level: Not on file   Occupational History    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 states he stopped a couple of days ago   Substance and Sexual Activity    Alcohol use: Not Currently     Comment: binge drinker on weekends    Drug use: Not Currently    Sexual activity: Not on file   Lifestyle    Physical activity     Days per week: Not on file     Minutes per session: Not on file    Stress: Not on file   Relationships    Social connections     Talks on phone: Not on file     Gets together: Not on file     Attends Pentecostalism service: Not on file     Active member of club or organization: Not on file     Attends meetings of clubs or organizations: Not on file     Relationship status: Not on file    Intimate partner violence     Fear of current or ex partner: Not on file Emotionally abused: Not on file     Physically abused: Not on file     Forced sexual activity: Not on file   Other Topics Concern    Not on file   Social History Narrative    Not on file         ASSESSMENT   Diagnosis Orders   1. Gastroesophageal reflux disease without esophagitis       #1:  Not optimally controlled. Likely need to stop all tobacco use. Encouraged to resume AcipHex twice daily. Since he is taking prednisone for gout instructed timing of when to take PPI and prednisone. He would like to follow-up closely and we are able to range in April 8 in office appointment. PLAN          Time spent on encounter (to include any same day medical record review): 14 minutes  Established E/M: 10-19 (24719), 20-29 (29871), 30-39 (91622), 40-54 (84813)   New E/M: 15-29 (06020), 30-44 (83929), 45-59 (18508), 60-74 (35784)  Telephone E/M: 5-10 (08505), 11-20 (98156), 21-30 (38751)    If applicable, see additional patient information and instructions under \"Patient Instructions. \"    No follow-ups on file. Patient Instructions   Take Aciphex 30 min before breakfast and again 30 min before dinner. Take prednisone every 24 hours with the meal.    Keep April 8 appt. Please note a portion of this chart was generated using dragon dictation software. Although every effort was made to ensure the accuracy of this automated transcription, some errors in transcription may have occurred. Pursuant to the emergency declaration under the [facility], [facility] waiver authority and the [employer] Act, this Virtual  Visit was conducted, with patient's consent, to reduce the patient's risk of exposure to COVID-19 and provide continuity of care for an established patient. Services were provided through a video synchronous discussion virtually to substitute for in-person clinic visit.  Patient was instructed that the AVS is available on My Chart or was emailed to the patient if not on My Chart. Lab orders were emailed to patient if they do not use a [address] [facility] lab. Any work notes were sent to patient through My Chart or email.

## 2021-04-08 ENCOUNTER — OFFICE VISIT (OUTPATIENT)
Dept: FAMILY MEDICINE CLINIC | Age: 41
End: 2021-04-08
Payer: MEDICAID

## 2021-04-08 VITALS
WEIGHT: 209.2 LBS | HEART RATE: 80 BPM | RESPIRATION RATE: 16 BRPM | BODY MASS INDEX: 31.81 KG/M2 | TEMPERATURE: 98.6 F | OXYGEN SATURATION: 99 % | SYSTOLIC BLOOD PRESSURE: 120 MMHG | DIASTOLIC BLOOD PRESSURE: 89 MMHG

## 2021-04-08 DIAGNOSIS — R10.9 ABDOMINAL PAIN, UNSPECIFIED ABDOMINAL LOCATION: Primary | ICD-10-CM

## 2021-04-08 DIAGNOSIS — R14.2 BURPING: ICD-10-CM

## 2021-04-08 DIAGNOSIS — K21.9 GASTROESOPHAGEAL REFLUX DISEASE, UNSPECIFIED WHETHER ESOPHAGITIS PRESENT: ICD-10-CM

## 2021-04-08 PROCEDURE — 4004F PT TOBACCO SCREEN RCVD TLK: CPT | Performed by: FAMILY MEDICINE

## 2021-04-08 PROCEDURE — G8417 CALC BMI ABV UP PARAM F/U: HCPCS | Performed by: FAMILY MEDICINE

## 2021-04-08 PROCEDURE — 99213 OFFICE O/P EST LOW 20 MIN: CPT | Performed by: FAMILY MEDICINE

## 2021-04-08 PROCEDURE — G8427 DOCREV CUR MEDS BY ELIG CLIN: HCPCS | Performed by: FAMILY MEDICINE

## 2021-04-08 NOTE — PROGRESS NOTES
[facility]  Progress Note  Luis Feeling  1980    04/08/21    Chief Complaint:   Avelina Rubio is a 37 y.o. male who is here for f/u of abdominal sx. HPI:   Progressively worsening abdominal pain. Swallowing is painful. Here today. On day taking prednisone a few days ago, experienced midback pain. Burping makesit go away. H pylori test Dec 21 was negative. Continues to experience left sided upper quadrant pain. Small HH on sept 2020 esophagram.  When asked if he has bloody stools or black tarry stools she tells me that earlier this week he may have seen dark stools but he cannot be sure that they were black. He did have higher vegetable intake and a meal earlier this week and thought it was dark green. ROS negative for headache, visionchanges, chest pain, shortness of breath, abdominal pain, urinary sx,    Past medical, surgical, and social history reviewed. and allergies reviewed.     No Known Allergies  Prior to Visit Medications    Not on File          Vitals:    04/08/21 1048   BP: 120/89   Pulse: 80   Resp: 16   Temp: 98.6 °F (37 °C)   TempSrc: Tympanic   SpO2: 99%   Weight: 209 lb 3.2 oz (94.9 kg)      Wt Readings from Last 3 Encounters:   04/08/21 209 lb 3.2 oz (94.9 kg)   04/01/21 220 lb (99.8 kg)   11/16/20 219 lb 3.2 oz (99.4 kg)     BP Readings from Last 3 Encounters:   04/08/21 120/89   04/01/21 (!) 154/99   11/16/20 107/73       Patient Active Problem List   Diagnosis    Gout    Chronic back pain    Impingement syndrome, shoulder    Vitamin D deficiency    Elevated glucose    Prediabetes    Disequilibrium    Subjective tinnitus of both ears       Immunization History   Administered Date(s) Administered    Tdap (Boostrix, Adacel) 01/25/2017       Past Medical History:   Diagnosis Date    GERD (gastroesophageal reflux disease)     Gout     Influenza 02/25/2017    Prediabetes 02/2020    Vitamin D deficiency 02/2020     Past Not on file   Other Topics Concern    Not on file   Social History Narrative    Not on file       O: /89   Pulse 80   Temp 98.6 °F (37 °C) (Tympanic)   Resp 16   Wt 209 lb 3.2 oz (94.9 kg)   SpO2 99%   BMI 31.81 kg/m²   Physical Exam  GEN: No acute distress,cooperative, well nourished, alert. HEENT: PEERLA, EOMI , normocephalic/atraumatic, external nose appears normal.  External ear is normal.    Neck: soft, supple, no appreciable thyromegaly,mass  CV: No upper extremity edema. Resp:  Breathing comfortably. Psych:normal affect. Neuro: AOx3  Abd: Negative Zhao sign. Mild tenderness to the left upper quadrant just inferior to the left anterior costal margin around the midclavicular line. No rebound. Other Pertinent Physical Exam findings: Mild TTP of the base of left second toes. ASSESSMENT   Diagnosis Orders   1. Abdominal pain, unspecified abdominal location  CT ABDOMEN PELVIS W IV CONTRAST Additional Contrast? Oral    DOM Cook MD, Gastroenterology, [facility]   2. Burping  CT ABDOMEN PELVIS W IV CONTRAST Additional Contrast? Oral   3. Gastroesophageal reflux disease, unspecified whether esophagitis present  DOM Cook MD, Gastroenterology, [facility]       #1-3: I would like to proceed with CT imaging to evaluate stomach and pancreas and spleen. Patient did request referral to gastroenterology and while he has been seen by [facility] gastroenterology he would like to see a gastroenterologist closer to him in [facility]. Consider whether EGD is indicated.       PLAN          Time spent on encounter (to include any same day medical record review): 22 minutes  Established E/M: 10-19 (14533), 20-29 (41609), 30-39 (28615), 40-54 (42605)   New E/M: 15-29 (66853), 30-44 (30259), 45-59 (00160), 60-74 (61129)  Telephone E/M: 5-10 (81082), 11-20 (97566), 21-30 (61563)    If applicable, see additional patient information and instructions under \"Patient Instructions. \"    No follow-ups on file. There are no Patient Instructions on file for this visit. Please note a portion of this chart was generated using dragon dictation software. Although every effort was made to ensure the accuracy of this automated transcription,some errors in transcription may have occurred.

## 2021-04-19 ENCOUNTER — HOSPITAL ENCOUNTER (OUTPATIENT)
Dept: CT IMAGING | Age: 41
Discharge: HOME | End: 2021-04-19
Payer: MEDICAID

## 2021-04-19 DIAGNOSIS — R14.2 BURPING: ICD-10-CM

## 2021-04-19 DIAGNOSIS — R10.9 ABDOMINAL PAIN, UNSPECIFIED ABDOMINAL LOCATION: ICD-10-CM

## 2021-04-19 PROCEDURE — 74177 CT ABD & PELVIS W/CONTRAST: CPT

## 2021-04-19 PROCEDURE — 6360000004 HC RX CONTRAST MEDICATION: Performed by: FAMILY MEDICINE

## 2021-04-19 RX ADMIN — IOHEXOL 50 ML: 240 INJECTION, SOLUTION INTRATHECAL; INTRAVASCULAR; INTRAVENOUS; ORAL at 18:39

## 2021-04-19 RX ADMIN — IOPAMIDOL 75 ML: 755 INJECTION, SOLUTION INTRAVENOUS at 18:39

## 2021-04-21 ENCOUNTER — TELEPHONE (OUTPATIENT)
Dept: FAMILY MEDICINE CLINIC | Age: 41
End: 2021-04-21

## 2021-04-21 NOTE — TELEPHONE ENCOUNTER
----- Message from Jimbo Cari sent at 4/21/2021  2:47 PM EDT -----  Subject: Message to Provider    QUESTIONS  Information for Provider? Pt wants to have a virtual visit ([phone])   to discuss new concerns about his recent CT scan- however he does have new   concerns (prompting message) about his abdominal discomfort that is is   still having. (He insists its not pain, only pressure) he was also told he   may be pre-diabetic and has some concerns about that was well.   ---------------------------------------------------------------------------  --------------  CALL BACK INFO  What is the best way for the office to contact you? OK to leave message on   voicemail  Preferred Call Back Phone Number? [phone]  ---------------------------------------------------------------------------  --------------  SCRIPT ANSWERS  Relationship to Patient? Self  Appointment reason? Well Care/Follow Ups  Select a Well Care/Follow Ups appointment reason? Adult Existing Condition   Follow Up [Diabetes, CHF, COPD, Hypertension/Blood Pressure Check]  (Is the patient requesting to be seen urgently for their symptoms?)? No  Is this follow up request related to routine Diabetes Management? No  Are you having any new concerns about your existing condition?  Yes

## 2021-05-03 ENCOUNTER — TELEPHONE (OUTPATIENT)
Dept: FAMILY MEDICINE CLINIC | Age: 41
End: 2021-05-03

## 2021-05-03 NOTE — TELEPHONE ENCOUNTER
Can clinic staff call patient and set up another appointment either telehealth or phone call for the follow-up on CT scan? See the explanation of me attempting to get back to Tess Obrien but he did not get back to me until about 45 minutes later. Telehealth is usually successful with the video with Tess Obrien but it did not seem to work most recently.

## 2021-05-07 ENCOUNTER — VIRTUAL VISIT (OUTPATIENT)
Dept: FAMILY MEDICINE CLINIC | Age: 41
End: 2021-05-07
Payer: MEDICAID

## 2021-05-07 DIAGNOSIS — F17.228 CHEWING TOBACCO NICOTINE DEPENDENCE WITH OTHER NICOTINE-INDUCED DISORDER: ICD-10-CM

## 2021-05-07 DIAGNOSIS — R10.13 EPIGASTRIC PAIN: Primary | ICD-10-CM

## 2021-05-07 PROCEDURE — G8428 CUR MEDS NOT DOCUMENT: HCPCS | Performed by: FAMILY MEDICINE

## 2021-05-07 PROCEDURE — 4004F PT TOBACCO SCREEN RCVD TLK: CPT | Performed by: FAMILY MEDICINE

## 2021-05-07 PROCEDURE — G8417 CALC BMI ABV UP PARAM F/U: HCPCS | Performed by: FAMILY MEDICINE

## 2021-05-07 PROCEDURE — 99213 OFFICE O/P EST LOW 20 MIN: CPT | Performed by: FAMILY MEDICINE

## 2021-05-07 NOTE — PROGRESS NOTES
[facility]  TELEMEDICINE Progress Note  Stephanie Vera DO      The risks and benefits of converting to a virtual visit were discussed in light of the current infectious disease epidemic. Patient also understood that insurance coverage and co-pays are up to their individual insurance plans. Patient identification was verified at the start of the visit. Immanuel Gerardo  1980    05/07/21    Patient location: Home address on file  Provider location: Physician's home    Chief Complaint:   Immanuel Gerardo is a 37 y.o. patient who is here for left sided abdominal pain. HPI:   Had left sided abdominal pain or   Uses the chewing tobacco.  CT scan was normal.  Has palpitations occasionally. ROS negative for headache, vision changes, chest pain, shortness of breath, abdominal pain, urinary sx, bowel changes. Past medical, surgical, and social history reviewed. Medications and allergies reviewed. No Known Allergies  Prior to Visit Medications    Not on File          Patient-Reported Vitals 3/29/2021   Patient-Reported Weight 214 lb   Patient-Reported Height 5 ft 8 in   Patient-Reported Systolic -      There were no vitals filed for this visit.    Wt Readings from Last 3 Encounters:   04/08/21 209 lb 3.2 oz (94.9 kg)   04/01/21 220 lb (99.8 kg)   11/16/20 219 lb 3.2 oz (99.4 kg)     BP Readings from Last 3 Encounters:   04/08/21 120/89   04/01/21 (!) 154/99   11/16/20 107/73       Patient Active Problem List   Diagnosis    Gout    Chronic back pain    Impingement syndrome, shoulder    Vitamin D deficiency    Elevated glucose    Prediabetes    Disequilibrium    Subjective tinnitus of both ears       Immunization History   Administered Date(s) Administered    Tdap (Boostrix, Adacel) 01/25/2017       Past Medical History:   Diagnosis Date    GERD (gastroesophageal reflux disease)     Gout     Influenza 02/25/2017    Prediabetes 02/2020    Vitamin D deficiency 02/2020 Past Surgical History:   Procedure Laterality Date    DENTAL SURGERY      root canal / cracked tooth    OTHER SURGICAL HISTORY      left shoulder - bone spurs    UPPER GASTROINTESTINAL ENDOSCOPY N/A 10/30/2019    EGD BIOPSY performed by Damon Hamm DO at [facility] EXTRACTION      age late 25s, in 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 states he stopped a couple of days ago   Substance and Sexual Activity    Alcohol use: Not Currently     Comment: binge drinker on weekends    Drug use: Not Currently    Sexual activity: Not on file   Lifestyle    Physical activity     Days per week: Not on file     Minutes per session: Not on file    Stress: Not on file   Relationships    Social connections     Talks on phone: Not on file     Gets together: Not on file     Attends Restoration service: Not on file     Active member of club or organization: Not on file     Attends meetings of clubs or organizations: Not on file     Relationship status: Not on file    Intimate partner violence     Fear of current or ex partner: Not on file     Emotionally abused: Not on file     Physically abused: Not on file     Forced sexual activity: Not on file   Other Topics Concern    Not on file   Social History Narrative    Not on file       O: There were no vitals taken for this visit. Physical Exam  PHYSICAL EXAMINATION:  [ INSTRUCTIONS:  \"[x]\" Indicates a positive item  \"[]\" Indicates a negative item  -- DELETE ALL ITEMS NOT EXAMINED]  Vital Signs: (As obtained by patient/caregiver or practitioner observation)    Constitutional: [x] Appears well-developed and well-nourished [x] No apparent distress      [] Abnormal-   Mental status  [x] Alert and awake  [x] Oriented to person/place/time [x]Able to follow commands      Eyes:  EOM    [x]  Normal  [] Abnormal-  Sclera  [x]  Normal  [] Abnormal -         Discharge [x]  None visible  [] Abnormal -    HENT:   [x] Normocephalic, atraumatic. [] Abnormal   [] Mouth/Throat: Mucous membranes are moist.     External Ears [x] Normal  [] Abnormal-     Neck: [x] No visualized mass     Pulmonary/Chest: [x] Respiratory effort normal.  [x] No visualized signs of difficulty breathing or respiratory distress        [] Abnormal-      Musculoskeletal:   [] Normal gait with no signs of ataxia         [x] Normal range of motion of neck        [] Abnormal-       Neurological:        [x] No Facial Asymmetry (Cranial nerve 7 motor function) (limited exam to video visit)          [x] No gaze palsy        [] Abnormal-         Skin:        [x] No significant exanthematous lesions or discoloration noted on facial skin         [] Abnormal-            Psychiatric:       [x] Normal Affect [] No Hallucinations        [] Abnormal-     Other pertinent observable physical exam findings- chewing tobacco during VV. Due to this being a TeleHealth encounter, evaluation of the following organ systems is limited: Vitals/Constitutional/EENT/Resp/CV/GI//MS/Neuro/Skin/Heme-Lymph-Imm. ASSESSMENT   Diagnosis Orders   1. Epigastric pain     2.  Chewing tobacco nicotine dependence with other nicotine-induced disorder       Needs to cut back on tobacco.    PLAN          Time spent on encounter (to include any same day medical record review): 20 minutes  Established E/M: 10-19 (45210), 20-29 (62240), 30-39 (30208), 40-54 (25054)   New E/M: 15-29 (47931), 30-44 (62645), 45-59 (68047), 60-74 (66975)  Telephone E/M: 5-10 (31966), 11-20 (31724), 21-30 (48281)    If applicable, see additional patient information and instructions under \"Patient Instructions. \"    No follow-ups on file. Patient Instructions   Check out [employer] on [employer]    Consider kardiamobile for the palpitations. [facility]) COVID-19 Vaccination Sites. Patients call [phone]. Ask for [place] or Pfizer  to schedule for a COVID-19 vaccine 8 am - 5 pm (weekdays)  [place] [address] ([address])  [facility] ([address] [place] [address] Street)  [facility] ([address])  [address] at [employer] ([address] [facility] ENTRANCE. [facility] M[facility] T[facility]ILITY Elías Munson, use Behavioral Health Entrance next to the Emergency Room Main entrance    Patient can use [employer] or visit https://gettheshot. coronavirus. [place].gov to check your eligibility for the vaccine and to book an appointment at a mass vaccination site or to get a link to other vaccine providers. Please note a portion of this chart was generated using dragon dictation software. Although every effort was made to ensure the accuracy of this automated transcription, some errors in transcription may have occurred. Pursuant to the emergency declaration under the [facility] waiver authority and the [employer] General Act, this Virtual  Visit was conducted, with patient's consent, to reduce the patient's risk of exposure to COVID-19 and provide continuity of care for an established patient.     Services were provided through a video synchronous discussion virtually to substitute for in-person clinic visit. Patient was instructed that the AVS is available on My Chart or was emailed to the patient if not on My Chart. Lab orders were emailed to patient if they do not use a [facility] lab. Any work notes were sent to patient through My Chart or email.

## 2021-07-03 ENCOUNTER — HOSPITAL ENCOUNTER (EMERGENCY)
Age: 41
Discharge: HOME | End: 2021-07-03
Attending: EMERGENCY MEDICINE
Payer: MEDICAID

## 2021-07-03 ENCOUNTER — APPOINTMENT (OUTPATIENT)
Dept: GENERAL RADIOLOGY | Age: 41
End: 2021-07-03
Payer: MEDICAID

## 2021-07-03 VITALS
SYSTOLIC BLOOD PRESSURE: 156 MMHG | DIASTOLIC BLOOD PRESSURE: 99 MMHG | RESPIRATION RATE: 17 BRPM | OXYGEN SATURATION: 99 % | HEART RATE: 84 BPM | WEIGHT: 220 LBS | BODY MASS INDEX: 33.45 KG/M2 | TEMPERATURE: 97.3 F

## 2021-07-03 DIAGNOSIS — R07.89 CHEST DISCOMFORT: Primary | ICD-10-CM

## 2021-07-03 LAB
A/G RATIO: 1.4 (ref 1.1–2.2)
ALBUMIN SERPL-MCNC: 4.4 G/DL (ref 3.4–5)
ALP BLD-CCNC: 67 U/L (ref 40–129)
ALT SERPL-CCNC: 21 U/L (ref 10–40)
ANION GAP SERPL CALCULATED.3IONS-SCNC: 11 MMOL/L (ref 3–16)
AST SERPL-CCNC: 20 U/L (ref 15–37)
BASOPHILS ABSOLUTE: 0 K/UL (ref 0–0.2)
BASOPHILS RELATIVE PERCENT: 0.5 %
BILIRUB SERPL-MCNC: 0.7 MG/DL (ref 0–1)
BUN BLDV-MCNC: 10 MG/DL (ref 7–20)
CALCIUM SERPL-MCNC: 9.6 MG/DL (ref 8.3–10.6)
CHLORIDE BLD-SCNC: 101 MMOL/L (ref 99–110)
CO2: 25 MMOL/L (ref 21–32)
CREAT SERPL-MCNC: 1.1 MG/DL (ref 0.9–1.3)
D DIMER: <200 NG/ML DDU (ref 0–229)
EOSINOPHILS ABSOLUTE: 0.2 K/UL (ref 0–0.6)
EOSINOPHILS RELATIVE PERCENT: 2.4 %
GFR AFRICAN AMERICAN: >60
GFR NON-AFRICAN AMERICAN: >60
GLOBULIN: 3.1 G/DL
GLUCOSE BLD-MCNC: 109 MG/DL (ref 70–99)
HCT VFR BLD CALC: 46.9 % (ref 40.5–52.5)
HEMOGLOBIN: 15.8 G/DL (ref 13.5–17.5)
LIPASE: 34 U/L (ref 13–60)
LYMPHOCYTES ABSOLUTE: 2.1 K/UL (ref 1–5.1)
LYMPHOCYTES RELATIVE PERCENT: 31.2 %
MCH RBC QN AUTO: 29.7 PG (ref 26–34)
MCHC RBC AUTO-ENTMCNC: 33.7 G/DL (ref 31–36)
MCV RBC AUTO: 88.3 FL (ref 80–100)
MONOCYTES ABSOLUTE: 0.3 K/UL (ref 0–1.3)
MONOCYTES RELATIVE PERCENT: 4.7 %
NEUTROPHILS ABSOLUTE: 4.1 K/UL (ref 1.7–7.7)
NEUTROPHILS RELATIVE PERCENT: 61.2 %
PDW BLD-RTO: 13.4 % (ref 12.4–15.4)
PLATELET # BLD: 201 K/UL (ref 135–450)
PMV BLD AUTO: 8.1 FL (ref 5–10.5)
POTASSIUM REFLEX MAGNESIUM: 4.1 MMOL/L (ref 3.5–5.1)
RBC # BLD: 5.31 M/UL (ref 4.2–5.9)
SODIUM BLD-SCNC: 137 MMOL/L (ref 136–145)
TOTAL PROTEIN: 7.5 G/DL (ref 6.4–8.2)
TROPONIN: <0.01 NG/ML
TROPONIN: <0.01 NG/ML
WBC # BLD: 6.6 K/UL (ref 4–11)

## 2021-07-03 PROCEDURE — 71045 X-RAY EXAM CHEST 1 VIEW: CPT

## 2021-07-03 PROCEDURE — 83690 ASSAY OF LIPASE: CPT

## 2021-07-03 PROCEDURE — 93005 ELECTROCARDIOGRAM TRACING: CPT | Performed by: EMERGENCY MEDICINE

## 2021-07-03 PROCEDURE — 99283 EMERGENCY DEPT VISIT LOW MDM: CPT

## 2021-07-03 PROCEDURE — 85025 COMPLETE CBC W/AUTO DIFF WBC: CPT

## 2021-07-03 PROCEDURE — 85379 FIBRIN DEGRADATION QUANT: CPT

## 2021-07-03 PROCEDURE — 80053 COMPREHEN METABOLIC PANEL: CPT

## 2021-07-03 PROCEDURE — 84484 ASSAY OF TROPONIN QUANT: CPT

## 2021-07-03 PROCEDURE — 36415 COLL VENOUS BLD VENIPUNCTURE: CPT

## 2021-07-03 ASSESSMENT — HEART SCORE: ECG: 1

## 2021-07-03 ASSESSMENT — ENCOUNTER SYMPTOMS
DIARRHEA: 0
SHORTNESS OF BREATH: 0
COLOR CHANGE: 0
CHEST TIGHTNESS: 0
BACK PAIN: 1
RESPIRATORY NEGATIVE: 1
PHOTOPHOBIA: 0
VOMITING: 0
STRIDOR: 0
NAUSEA: 1
CONSTIPATION: 0
COUGH: 0
ABDOMINAL PAIN: 0
WHEEZING: 0

## 2021-07-03 ASSESSMENT — PAIN SCALES - GENERAL: PAINLEVEL_OUTOF10: 6

## 2021-07-03 NOTE — ED PROVIDER NOTES
I independently performed a history and physical on Portia Younger. .   All diagnostic, treatment, and disposition decisions were made by myself in conjunction with the advanced practice provider. Briefly, this is a 37 y.o. male here for left-sided chest wall pain which she says is more his left upper quadrant going on for the past couple of months. Has some associated nausea no vomiting or diarrhea. No palpitations or dyspnea. No fevers chills sweats. No cough congestion no other concerns he can recall. He had a stress test about a year ago in September 2020 has had intermittent pain since then. .    On exam, he is well-appearing male is tender over his left eighth rib. Lungs clear clear. Heart is regular rhythm. Abdomen soft and nondistended. EKG   EKG reviewed by myself  Dated today, 1535  Rate 77, NSR, Normal EKG with NSSWT changes in inferior and lateral leads. Compared to Oct 2019; No change. Aleisha Knox MD  07/03/21 1543              Screenings           Heart Score for chest pain patients  History: Moderately Suspicious  ECG: Non-Specifc repolarization disturbance/LBTB/PM  Patient Age: < 39 years  *Risk factors for Atherosclerotic disease: Diabetes Mellitus, Obesity  Risk Factors: 1 or 2 risk factors  Troponin: < 1X normal limit  Heart Score Total: 3       MDM  Chest pain with HEART score of 3; Delta Trop negative, Dimer negative. F/u with cards, GI    Patient Referrals:  Troy Rubio DO  [address] [place] [address]  [phone]    Schedule an appointment as soon as possible for a visit   For a Re-check in  3-5    days. Clare Blevins DO  [address]  [phone]    Schedule an appointment as soon as possible for a visit   For a Re-check in   3-5   days.     [facility] Emergency Department  [address]  [address] [place] [address]  [phone]  Go to   As needed, If symptoms worsen      Discharge Medications: There are no discharge medications for this patient. FINAL IMPRESSION  1. Chest discomfort        Blood pressure (!) 156/99, pulse 84, temperature 97.3 °F (36.3 °C), resp. rate 17, weight 220 lb (99.8 kg), SpO2 99 %. For further details of Shireen Rodo Sanford's emergency department encounter, please see documentation by advanced practice providerJeni.          Ivy Wilder MD  07/03/21 6296

## 2021-07-03 NOTE — ED PROVIDER NOTES
[address] ENCOUNTER        Pt Name: Kevin Smith MRN: [id number]  Birthdate 1980  Date of evaluation: 7/3/2021  Provider: JOANA Davidson  PCP: Clarisse Loaiza DO  Note Started: 3:38 PM EDT        I have seen and evaluated this patient with my supervising physician [address] [facility]       Chief Complaint   Patient presents with    Chest Pain     Pt states he started with chest pain on left side yesterday and subsided, however began again today        HISTORY OF PRESENT ILLNESS   (Location, Timing/Onset, Context/Setting, Quality, Duration, Modifying Factors, Severity, Associated Signs and Symptoms)  Note limiting factors. Chief Complaint: CP     Kevin Mcmahon. is a 37 y.o. male with past medical history of GERD, prediabetes and gout who presents to the ED with complaint of chest pain. 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  Patient states it subsided but then when he got home he states pain reoccurred. Patient dates this time pain radiated from his left-sided chest into his back. Patient states he has a history of GERD but states normally his GERD symptoms are located to his right-sided chest.  Patient states he never had pain like this before. Became concerned and came to the ED for further evaluation and treatment. Patient denies any shortness of breath, pleuritic pain, diaphoresis, headache, lightheadedness/dizziness, abdominal pain, urinary symptoms or changes in bowel movements. States he did have some nausea but denies any vomiting. Denies cough, hemoptysis, fever/chills or rashes/lesions. Denies any injury or trauma. Patient denies any aggravating leaving factors.   States pain does not seem to be worse with movement of the left arm or palpation of the chest.  Denies taking any over-the-counter medication for symptom control. Patient became concerned and came to the ED for further evaluation and treatment. States mother did have a history of stroke in the past but denies any significant family history of heart disease. Patient states he did have treadmill stress test a year ago at [facility] [employer]. and states he was told it was unremarkable. Patient states he does do some smokeless tobacco but denies any smoking status. Denies any history of hypertension or hyperlipidemia. Denies any history of DVT or PE per self. States mother did have some blood clots. Patient denies any recent travel, trips, surgery or immobilization. Denies any blood thinning medication usage. Nursing Notes were all reviewed and agreed with or any disagreements were addressed in the HPI. REVIEW OF SYSTEMS    (2-9 systems for level 4, 10 or more for level 5)     Review of Systems   Constitutional: Negative for activity change, appetite change, chills, diaphoresis, fatigue and fever. Eyes: Negative for photophobia and visual disturbance. Respiratory: Negative. Negative for cough, chest tightness, shortness of breath, wheezing and stridor. Cardiovascular: Positive for chest pain. Negative for palpitations and leg swelling. Gastrointestinal: Positive for nausea. Negative for abdominal pain, constipation, diarrhea and vomiting. Genitourinary: Negative for decreased urine volume, difficulty urinating, dysuria, flank pain, frequency, hematuria and urgency. Musculoskeletal: Positive for back pain. Negative for arthralgias, myalgias, neck pain and neck stiffness. Skin: Negative for color change, pallor, rash and wound. Neurological: Negative for dizziness, tremors, seizures, syncope, speech difficulty, weakness, light-headedness, numbness and headaches. Positives and Pertinent negatives as per HPI. Except as noted above in the ROS, all other systems were reviewed and negative. PAST MEDICAL HISTORY     Past Medical History:   Diagnosis Date    GERD (gastroesophageal reflux disease)     Gout     Influenza 02/25/2017    Prediabetes 02/2020    Vitamin D deficiency 02/2020         SURGICAL HISTORY     Past Surgical History:   Procedure Laterality Date    DENTAL SURGERY      root canal / cracked tooth    OTHER SURGICAL HISTORY      left shoulder - bone spurs    UPPER GASTROINTESTINAL ENDOSCOPY N/A 10/30/2019    EGD BIOPSY performed by Kirsten Capps DO at [facility] EXTRACTION      age late 25s, in [facility]       There are no discharge medications for this patient. ALLERGIES     Patient has no known allergies. FAMILYHISTORY       Family History   Problem Relation Age of Onset    Alcohol Abuse Father     Stroke Mother     Other Brother         gout    Diabetes Maternal Uncle           SOCIAL HISTORY       Social History     Tobacco Use    Smoking status: Former Smoker     Years: 5.00     Types: Cigarettes     Quit date: 9/21/2010     Years since quitting: 10.7    Smokeless tobacco: Current User     Types: Chew    Tobacco comment: pt. states he stopped a couple of days ago   Vaping Use    Vaping Use: Never used   Substance Use Topics    Alcohol use: Not Currently     Comment: binge drinker on weekends    Drug use: Not Currently       SCREENINGS      Heart Score for chest pain patients  History:  Moderately Suspicious  ECG: Non-Specifc repolarization disturbance/LBTB/PM  Patient Age: < 39 years  *Risk factors for Atherosclerotic disease: Diabetes Mellitus, Obesity  Risk Factors: 1 or 2 risk factors  Troponin: < 1X normal limit  Heart Score Total: 3      PHYSICAL EXAM    (up to 7 for level 4, 8 or more for level 5)     ED Triage Vitals [07/03/21 1518]   BP Temp Temp src Pulse Resp SpO2 Height Weight   (!) 156/99 97.3 °F (36.3 °C) -- 84 17 99 % -- 220 lb (99.8 kg)       Physical Exam  Constitutional:       General: He is not in acute distress. Appearance: Normal appearance. He is well-developed. He is not ill-appearing, toxic-appearing or diaphoretic. HENT:      Head: Normocephalic and atraumatic. Right Ear: External ear normal.      Left Ear: External ear normal.   Eyes:      General:         Right eye: No discharge. Left eye: No discharge. Cardiovascular:      Rate and Rhythm: Normal rate and regular rhythm. Pulses: Normal pulses. Heart sounds: Normal heart sounds. No murmur heard. No friction rub. No gallop. Comments: 2+ radial pulses bilaterally. There is no pedal edema. No calf tenderness. No JVD. Pulmonary:      Effort: Pulmonary effort is normal. No respiratory distress. Breath sounds: Normal breath sounds. No stridor. No wheezing, rhonchi or rales. Comments: No reproducible tender palpation of the left anterior chest, left lateral chest or left upper back. No reproducible pain with movement of the left shoulder or left arm. Chest:      Chest wall: No tenderness. Abdominal:      General: Abdomen is flat. Bowel sounds are normal. There is no distension. Palpations: Abdomen is soft. There is no mass. Tenderness: There is no abdominal tenderness. There is no right CVA tenderness, left CVA tenderness, guarding or rebound. Negative signs include Zhao's sign and McBurney's sign. Hernia: No hernia is present. Musculoskeletal:         General: Normal range of motion. Cervical back: Normal range of motion and neck supple. No rigidity or tenderness. Lymphadenopathy:      Cervical: No cervical adenopathy. Skin:     General: Skin is warm and dry. Coloration: Skin is not pale. Findings: No erythema or rash. Neurological:      Mental Status: He is alert and oriented to person, place, and time.    Psychiatric:         Behavior: Behavior normal.         DIAGNOSTIC RESULTS   LABS:    Labs Reviewed   COMPREHENSIVE METABOLIC PANEL W/ REFLEX TO MG FOR LOW K - Abnormal; Notable for the following components:       Result Value    Glucose 109 (*)     All other components within normal limits    Narrative:     Performed at:  [facility] [address], [facility], [address]   Phone [phone]   CBC WITH AUTO DIFFERENTIAL    Narrative:     Performed at:  [facility] [address], [facility], [address]   Phone [phone]   LIPASE    Narrative:     Performed at:  [facility] [address], [facility], [facility] [email or web address]   Phone [phone]   TROPONIN    Narrative:     Performed at:  [facility] [address], [facility], [address] [place] [address]   Phone [phone]   D-DIMER, QUANTITATIVE    Narrative:     Performed at:  [facility] [address], [facility], [facility] [address] [email or web address]   Phone [phone]   TROPONIN    Narrative:     Performed at:  [facility] [address], [facility], [facility] [address] [email or web address]   Phone [phone]       When ordered only abnormal lab results are displayed. All other labs were within normal range or not returned as of this dictation. EKG: When ordered, EKG's are interpreted by the Emergency Department Physician in the absence of a cardiologist.  Please see their note for interpretation of EKG. RADIOLOGY:   Non-plain film images such as CT, Ultrasound and MRI are read by the radiologist. Plain radiographic images are visualized and preliminarily interpreted by the ED Provider with the below findings:        Interpretation per the Radiologist below, if available at the time of this note:    XR CHEST PORTABLE   Final Result   No evidence of acute cardiopulmonary disease. No results found.         PROCEDURES   Unless otherwise noted below, none     Procedures    CRITICAL CARE TIME   N/A    CONSULTS:  None      EMERGENCY DEPARTMENT COURSE and DIFFERENTIAL DIAGNOSIS/MDM:   Vitals:    Vitals:    07/03/21 1518   BP: (!) 156/99   Pulse: 84   Resp: 17   Temp: 97.3 °F (36.3 °C)   SpO2: 99%   Weight: 220 lb (99.8 kg)       Patient was given the following medications:  Medications - No data to display      Cardiac Stress Test Exercise - Treadmill 9/21/2020: Impression:  Negative for ischemia. Patient is a 26-year-old male who presents to the ED with complaint of chest discomfort. Patient states left-sided chest discomfort that radiates into his back that began earlier today. EKG interpreted by attending. Troponin was normal.  CBC showed normal white count, hemoglobin and platelets. CMP unremarkable. Lipase normal.  D-dimer normal.  Chest x-ray unremarkable. Patient has had cardiac stress test within the past year which was unremarkable. Low heart score and will obtain delta troponin for further restratification. Delta troponin was negative. Believe patient be safely discharged home with close outpatient follow-up by cardiology. Return to the ED for any worsening symptoms. Low suspicion for PE, dissection, AAA, pneumonia, pneumothorax, respiratory distress, GERD, anxiety, CHF, COPD, asthma, surgical abdomen or other emergent etiology at this time. FINAL IMPRESSION      1. Chest discomfort          DISPOSITION/PLAN   DISPOSITION        PATIENT REFERRED TO:  Kasia Newman DO  [address] [place] [address] Drive [address]    Schedule an appointment as soon as possible for a visit   For a Re-check in  3-5    days. Asad Blevins DO  [address]  [phone]    Schedule an appointment as soon as possible for a visit   For a Re-check in   3-5   days. [facility] Emergency Department  [address]  [facility]  [phone]  Go to   As needed, If symptoms worsen      DISCHARGE MEDICATIONS:  There are no discharge medications for this patient.       DISCONTINUED MEDICATIONS:  There are no discharge medications for this patient.              (Please note that portions of this note were completed with a voice recognition program.  Efforts were made to edit the dictations but occasionally words are mis-transcribed.)    JOANA Davidson (electronically signed)          JOANA Tapia  07/03/21 5821

## 2021-07-04 LAB
EKG ATRIAL RATE: 77 BPM
EKG DIAGNOSIS: NORMAL
EKG P AXIS: 42 DEGREES
EKG P-R INTERVAL: 156 MS
EKG Q-T INTERVAL: 394 MS
EKG QRS DURATION: 92 MS
EKG QTC CALCULATION (BAZETT): 445 MS
EKG R AXIS: 41 DEGREES
EKG T AXIS: 4 DEGREES
EKG VENTRICULAR RATE: 77 BPM

## 2021-07-04 PROCEDURE — 93010 ELECTROCARDIOGRAM REPORT: CPT | Performed by: INTERNAL MEDICINE

## 2021-08-23 ENCOUNTER — OFFICE VISIT (OUTPATIENT)
Dept: FAMILY MEDICINE CLINIC | Age: 41
End: 2021-08-23
Payer: MEDICAID

## 2021-08-23 VITALS
BODY MASS INDEX: 31.81 KG/M2 | OXYGEN SATURATION: 98 % | SYSTOLIC BLOOD PRESSURE: 120 MMHG | HEART RATE: 91 BPM | DIASTOLIC BLOOD PRESSURE: 80 MMHG | TEMPERATURE: 97.5 F | WEIGHT: 209.2 LBS | RESPIRATION RATE: 14 BRPM

## 2021-08-23 DIAGNOSIS — Z91.018 FOOD ALLERGY: Primary | ICD-10-CM

## 2021-08-23 DIAGNOSIS — R14.0 ABDOMINAL BLOATING: ICD-10-CM

## 2021-08-23 DIAGNOSIS — F17.220 CHEWING TOBACCO NICOTINE DEPENDENCE WITHOUT COMPLICATION: ICD-10-CM

## 2021-08-23 PROCEDURE — 99213 OFFICE O/P EST LOW 20 MIN: CPT | Performed by: FAMILY MEDICINE

## 2021-08-23 PROCEDURE — G8427 DOCREV CUR MEDS BY ELIG CLIN: HCPCS | Performed by: FAMILY MEDICINE

## 2021-08-23 PROCEDURE — G8417 CALC BMI ABV UP PARAM F/U: HCPCS | Performed by: FAMILY MEDICINE

## 2021-08-23 PROCEDURE — 4004F PT TOBACCO SCREEN RCVD TLK: CPT | Performed by: FAMILY MEDICINE

## 2021-08-23 NOTE — PROGRESS NOTES
[facility]  Progress Note  Panda Olsen DO Fernando Santos.  1980    08/24/21    Chief Complaint:   Fernando Reyes is a 37 y.o. male who is here for left upper quadrant discomfort        HPI:   Just underwent a normal colonoscopy earlier this month. Sometimes falls asleep with dip in mouth. Advised by me and his gastroenterologist to stop the tobacco chewing and dipping. He finds it difficult to quit. Staying busy occupied helping his significant other raise now with 1week old. He is proud of the photos to share with me. He does wonder if he has some food allergies. No obvious food allergies known. Has not had to use an EpiPen. Extensive work-up has been completed including normal stress test and EGD. Continues to experience abdominal bloating and denies melena and hematochezia. ROS negative for headache, visionchanges, chest pain, shortness of breath, abdominal pain, urinary sx, bowel changes. Past medical, surgical, and social history reviewed. and allergies reviewed. No Known Allergies  Prior to Visit Medications    Medication Sig Taking?  Authorizing Provider   nicotine polacrilex (NICORETTE) 2 MG gum Take 1 each by mouth every hour as needed for Smoking cessation Yes Gracie Phoenix, DO          Vitals:    08/23/21 1609   BP: 120/80   Pulse: 91   Resp: 14   Temp: 97.5 °F (36.4 °C)   TempSrc: Temporal   SpO2: 98%   Weight: 209 lb 3.2 oz (94.9 kg)      Wt Readings from Last 3 Encounters:   08/23/21 209 lb 3.2 oz (94.9 kg)   07/03/21 220 lb (99.8 kg)   04/08/21 209 lb 3.2 oz (94.9 kg)     BP Readings from Last 3 Encounters:   08/23/21 120/80   07/03/21 (!) 156/99   04/08/21 120/89       Patient Active Problem List   Diagnosis    Gout    Chronic back pain    Impingement syndrome, shoulder    Vitamin D deficiency    Elevated glucose    Prediabetes    Disequilibrium    Subjective tinnitus of both ears       Immunization History   Administered Date(s) Administered    Tdap (Boostrix, Adacel) 01/25/2017       Past Medical History:   Diagnosis Date    GERD (gastroesophageal reflux disease)     Gout     Influenza 02/25/2017    Prediabetes 02/2020    Vitamin D deficiency 02/2020     Past Surgical History:   Procedure Laterality Date    DENTAL SURGERY      root canal / cracked tooth    OTHER SURGICAL HISTORY      left shoulder - bone spurs    UPPER GASTROINTESTINAL ENDOSCOPY N/A 10/30/2019    EGD BIOPSY performed by Eladio Katz DO at [address] EXTRACTION      age late 25s, in [facility]     Family History   Problem Relation Age of Onset    Alcohol Abuse Father     Stroke Mother     Other Brother         gout    Diabetes Maternal Uncle      Social History     Socioeconomic History    Marital status: Single     Spouse name: Not on file    Number of children: 0    Years of education: Not on file    Highest education level: Not on file   Occupational History    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 states he stopped a couple of days ago   Vaping Use    Vaping Use: Never used   Substance and Sexual Activity    Alcohol use: Not Currently     Comment: binge drinker on weekends    Drug use: Not Currently    Sexual activity: Not on file   Other Topics Concern    Not on file   Social History Narrative    Not on file     Social Determinants of Health     Financial Resource Strain: Low Risk     Difficulty of Paying Living Expenses: Not hard at all   Food Insecurity: Unknown    Worried About Running Out of Food in the Last Year: Never true    Kofi of Food in the Last Year: Not asked   Transportation Needs: No Transportation Needs    Lack of Transportation (Medical): No    Lack of Transportation (Non-Medical):  No   Physical Activity:     Days of Exercise per Week:     Minutes of Exercise per Session:    Stress:     Feeling of Stress :    Social Connections:     Frequency of Communication with Friends and Family:     Frequency of Social Gatherings with Friends and Family:     Attends Sikhism Services:     Active Member of Clubs or Organizations:     Attends Club or Organization Meetings:     Marital Status:    Intimate Partner Violence:     Fear of Current or Ex-Partner:     Emotionally Abused:     Physically Abused:     Sexually Abused:        O: /80   Pulse 91   Temp 97.5 °F (36.4 °C) (Temporal)   Resp 14   Wt 209 lb 3.2 oz (94.9 kg)   SpO2 98%   BMI 31.81 kg/m²   Physical Exam  GEN: No acute distress,cooperative, well nourished, alert. HEENT: PEERLA, EOMI , normocephalic/atraumatic, external nose appears normal.  External ear is normal.    Neck: soft, supple, no appreciable thyromegaly,mass  CV: No upper extremity edema. Resp:  Breathing comfortably. Psych:normal affect. Neuro: AOx3  Other Pertinent Physical Exam findings: n/a      ASSESSMENT   Diagnosis Orders   1. Food allergy  Allergen, Food, Comprehensive Profile 1   2. Abdominal bloating  Allergen, Food, Comprehensive Profile 1   3. Chewing tobacco nicotine dependence without complication  nicotine polacrilex (NICORETTE) 2 MG gum     #1 and #2: Check for food allergies. He is agreeable with this approach. #3: Patient was counseled on tobacco cessation. Based upon patient's motivation to change his behavior, the following plan was agreed upon: patient will think about his/her triggers for using tobacco and patient will think about reasons why he/she should quit using tobacco.  He was provided with a list of local tobacco cessation resources. Provider spent 5 minutes counseling patient.            PLAN          Time spent on encounter (including any number of the following: review of labs, imaging, provider notes, outside hospital records; performing examination/evaluation; counseling patient and family; ordering medications/tests; placing referrals and communication with referring physicians; coordination of care, and documentation in the EHR): 20 minutes  Established E/M: 10-19 (85589), 20-29 (66940), 30-39 (39494), 40-54 (18379)   New E/M: 15-29 (48980), 30-44 (63505), 45-59 (28192), 60-74 (11502)  Telephone E/M: 5-10 ([phone]), 11-20 (44739), 21-30 (91741)    If applicable, see additional patient information and instructions under \"Patient Instructions. \"    Return if symptoms worsen or fail to improve. Patient Instructions   Get labwork done. [address], [address] [place] [address] Drive  Phone: [phone]  Shruthi Montgomery., Tue., Johanu., Fri. 7:30 a. m.5 p.m.  Wed. 7:30 a.m.Noon          Please note a portion of this chart was generated using dragon dictation software. Although every effort was made to ensure the accuracy of this automated transcription,some errors in transcription may have occurred.

## 2021-08-23 NOTE — PATIENT INSTRUCTIONS
Get labwork done. [address], [address], [address] [place] [address] Drive  Phone: [phone]  Gail Sarah., Johanu., Fri. 7:30 a. m.5 p.m.  Wed. 7:30 a.m.Noon

## 2021-09-04 ENCOUNTER — TELEPHONE (OUTPATIENT)
Dept: FAMILY MEDICINE CLINIC | Age: 41
End: 2021-09-04

## 2021-09-04 RX ORDER — INDOMETHACIN 50 MG/1
50 CAPSULE ORAL 3 TIMES DAILY
Qty: 60 CAPSULE | Refills: 3 | Status: SHIPPED | OUTPATIENT
Start: 2021-09-04

## 2021-09-08 ENCOUNTER — VIRTUAL VISIT (OUTPATIENT)
Dept: FAMILY MEDICINE CLINIC | Age: 41
End: 2021-09-08
Payer: MEDICAID

## 2021-09-08 ENCOUNTER — TELEPHONE (OUTPATIENT)
Dept: FAMILY MEDICINE CLINIC | Age: 41
End: 2021-09-08

## 2021-09-08 DIAGNOSIS — M10.072 ACUTE IDIOPATHIC GOUT OF LEFT FOOT: Primary | ICD-10-CM

## 2021-09-08 PROCEDURE — G8427 DOCREV CUR MEDS BY ELIG CLIN: HCPCS | Performed by: FAMILY MEDICINE

## 2021-09-08 PROCEDURE — 99213 OFFICE O/P EST LOW 20 MIN: CPT | Performed by: FAMILY MEDICINE

## 2021-09-08 RX ORDER — PREDNISONE 20 MG/1
40 TABLET ORAL DAILY
Qty: 10 TABLET | Refills: 0 | Status: SHIPPED | OUTPATIENT
Start: 2021-09-08 | End: 2021-09-13

## 2021-09-08 RX ORDER — FEBUXOSTAT 40 MG/1
40 TABLET, FILM COATED ORAL DAILY
Qty: 30 TABLET | Refills: 2 | Status: SHIPPED | OUTPATIENT
Start: 2021-09-08

## 2021-09-08 NOTE — TELEPHONE ENCOUNTER
Please send work note to his [employer] account. He has missed work today and tomorrow due to medical condition.

## 2021-09-08 NOTE — TELEPHONE ENCOUNTER
Office has been notified that pt is requiring Prior Authorization for the following medication:  -- PA Request - Febuxostat    Please initiate this request through CoverMyMeds, contacting the following Payor/Insurance:  -- Santiago Roman    Please see below, or the documentation attached to this encounter for any additional information that may assist in processing PA:  --     Thank you!

## 2021-09-10 NOTE — TELEPHONE ENCOUNTER
Received DENIAL for Febuxostat 40MG tablets. Denial letter attached. Please advise patient. Thank you.

## 2021-09-13 NOTE — TELEPHONE ENCOUNTER
Uroloric is denied. Does he want to take Allopurinol which is covered? Allopurinol is a preventive medication which would mean Tomi Santiago would need to take it every day. It would also mean required blood work every 6 to 12 months to check kidney health.

## 2021-09-13 NOTE — TELEPHONE ENCOUNTER
For medication to take only on a prn basis when he is having a flareup, then he could then take the indomethacin capsules which were already prescribed by the nurse practitioner on September 4. Please let him know that this prescription was sent to [address] [place] [address].

## 2021-09-13 NOTE — TELEPHONE ENCOUNTER
Pt stated that he Is worried about that medication hurting his stomach and doesn't want to take things everyday. Pt wants to know if he can only take it when he has flair ups and If not can he get something that is only when needed. Please advise.   Thank you

## 2021-10-14 ENCOUNTER — VIRTUAL VISIT (OUTPATIENT)
Dept: INFECTIOUS DISEASES | Age: 41
End: 2021-10-14
Payer: MEDICAID

## 2021-10-14 DIAGNOSIS — Z11.4 SCREENING FOR HIV (HUMAN IMMUNODEFICIENCY VIRUS): ICD-10-CM

## 2021-10-14 DIAGNOSIS — K21.9 GASTROESOPHAGEAL REFLUX DISEASE WITHOUT ESOPHAGITIS: ICD-10-CM

## 2021-10-14 DIAGNOSIS — E66.09 CLASS 1 OBESITY DUE TO EXCESS CALORIES WITH BODY MASS INDEX (BMI) OF 31.0 TO 31.9 IN ADULT, UNSPECIFIED WHETHER SERIOUS COMORBIDITY PRESENT: ICD-10-CM

## 2021-10-14 DIAGNOSIS — R73.03 PREDIABETES: ICD-10-CM

## 2021-10-14 DIAGNOSIS — Z22.7 LTBI (LATENT TUBERCULOSIS INFECTION): Primary | ICD-10-CM

## 2021-10-14 DIAGNOSIS — Z71.89 ENCOUNTER FOR MEDICATION COUNSELING: ICD-10-CM

## 2021-10-14 PROCEDURE — G8427 DOCREV CUR MEDS BY ELIG CLIN: HCPCS | Performed by: INTERNAL MEDICINE

## 2021-10-14 PROCEDURE — 99203 OFFICE O/P NEW LOW 30 MIN: CPT | Performed by: INTERNAL MEDICINE

## 2021-10-14 RX ORDER — FAMOTIDINE 40 MG/1
40 TABLET, FILM COATED ORAL 2 TIMES DAILY
COMMUNITY
Start: 2021-09-15

## 2021-10-14 ASSESSMENT — ENCOUNTER SYMPTOMS
SHORTNESS OF BREATH: 0
RHINORRHEA: 0
ABDOMINAL PAIN: 0
BACK PAIN: 0
TROUBLE SWALLOWING: 0
EYE DISCHARGE: 0
DIARRHEA: 0
WHEEZING: 0
COUGH: 0
CONSTIPATION: 0
SORE THROAT: 0
EYE REDNESS: 0
NAUSEA: 0

## 2021-10-14 NOTE — PROGRESS NOTES
Infectious Diseases Clinic NewPatient Note    Reason for Visit:                Positive QuantiFERON test.  Primary Care Physician:  Kate Machado DO  History Obtained From:   Patient , Medical Records     CHIEF COMPLAINT:    Chief Complaint   Patient presents with    New Patient     Np, positive TB test       HISTORY OF PRESENT ILLNESS: Talon Yoon is a 37 y.o. pleasant male patient, who had a virtual visit with me today as a new patient. History was obtained from chart review and the patient. The patient had a virtual visit with me today for above mentioned complaints. The patient has been referred to me by Dr. Cristi Castillo from patient's PCPs office for positive QuantiFERON test done at  [facility] lab on 9/16/2021 and then again on 9/27/2021. The patient had a virtual appointment with me today. Past Medical History:   Past medical  history wasreviewed by me during this visit in detail. Past Medical History:   Diagnosis Date    GERD (gastroesophageal reflux disease)     Gout     Influenza 02/25/2017    Prediabetes 02/2020    Vitamin D deficiency 02/2020       Past Surgical History:  Past surgical history was reviewed by me during this visit in detail. Past Surgical History:   Procedure Laterality Date    DENTAL SURGERY      root canal / cracked tooth    OTHER SURGICAL HISTORY      left shoulder - bone spurs    UPPER GASTROINTESTINAL ENDOSCOPY N/A 10/30/2019    EGD BIOPSY performed by Kaycee Beyer DO at 1 [facility] EXTRACTION      age late 26s, in [facility]       Current Medications:    All medicationswere reviewed by me during this visit  Current Outpatient Medications   Medication Sig Dispense Refill    famotidine (PEPCID) 40 MG tablet Take 40 mg by mouth 2 times daily      febuxostat (ULORIC) 40 MG TABS tablet Take 1 tablet by mouth daily (Patient not taking: Reported on 10/14/2021) 30 tablet 2    indomethacin (INDOCIN) 50 MG capsule Take 1 capsule by mouth 3 times daily 3 times daily until flare resolved (Patient not taking: Reported on 10/14/2021) 60 capsule 3    nicotine polacrilex (NICORETTE) 2 MG gum Take 1 each by mouth every hour as needed for Smoking cessation (Patient not taking: Reported on 10/14/2021) 110 each 3     No current facility-administered medications for this visit. Family history: All family history was reviewed by me today    Family History   Problem Relation Age of Onset    Alcohol Abuse Father     Stroke Mother     Other Brother         gout    Diabetes Maternal Uncle        No family history of immunocompromising or autoimmune conditions. No h/o TBin in family or contacts      REVIEW OF SYSTEMS:      Review of Systems   Constitutional: Negative for chills, diaphoresis and fever. HENT: Negative for ear discharge, ear pain, rhinorrhea, sore throat and trouble swallowing. Eyes: Negative for discharge and redness. Respiratory: Negative for cough, shortness of breath and wheezing. Cardiovascular: Negative for chest pain and leg swelling. Gastrointestinal: Negative for abdominal pain, constipation, diarrhea and nausea. Chronic acid reflux issues. Endocrine: Negative for polyuria. Genitourinary: Negative for dysuria, flank pain, frequency, hematuria and urgency. Musculoskeletal: Negative for back pain and myalgias. Skin: Negative for rash. Neurological: Negative for dizziness, seizures and headaches. Hematological: Does not bruise/bleed easily. Psychiatric/Behavioral: Negative for hallucinations and suicidal ideas. All other systems reviewed and are negative. PHYSICAL EXAM:      There were no vitals filed for this visit. Physical Exam      No physical exam could be done today due to virtual nature of the visit.       DATA:  All available lab data was reviewed by me during this visit    Last CBC:  Lab Results   Component Value Date    WBC 6.6 07/03/2021    HGB 15.8 07/03/2021    HCT 46.9 07/03/2021    MCV 88.3 07/03/2021     07/03/2021    LYMPHOPCT 31.2 07/03/2021    RBC 5.31 07/03/2021    MCH 29.7 07/03/2021    MCHC 33.7 07/03/2021    RDW 13.4 07/03/2021       Last BMP:  Lab Results   Component Value Date     07/03/2021    K 4.1 07/03/2021     07/03/2021    CO2 25 07/03/2021    BUN 10 07/03/2021    CREATININE 1.1 07/03/2021    GLUCOSE 109 07/03/2021    CALCIUM 9.6 07/03/2021        Hepatic Function Panel:   Lab Results   Component Value Date    ALKPHOS 67 07/03/2021    ALT 21 07/03/2021    AST 20 07/03/2021    PROT 7.5 07/03/2021    BILITOT 0.7 07/03/2021    BILIDIR <0.2 05/29/2018    IBILI see below 05/29/2018    LABALBU 4.4 07/03/2021       Last CK: No results found for: CKTOTAL    Last ESR:  No results found for: SEDRATE    Last CRP:  No results found for: CRP    1. Imaging: All pertinent images and reports for the current visit were reviewed by me during this visit. Outside records:    Labs, Microbiology, Radiology and pertinentresults from Care everywhere, if available, were reviewed as a part of the consultation. Problem list:       Patient Active Problem List   Diagnosis Code    Gout M10.9    Chronic back pain M54.9, G89.29    Impingement syndrome, shoulder M75.40    Vitamin D deficiency E55.9    Elevated glucose R73.09    Prediabetes R73.03    Disequilibrium R42    Subjective tinnitus of both ears H93.13       Microbiology:       All available micro data was reviewed by me during this visit    MYCOBACTERIA/TB  ?09/27/2021  MYCOBACTERIA/TB [facility])? Component 09/27/2021 09/16/2021        Quantiferon TB Gold POSITIVEAbnormal      POSITIVEAbnormal        NIL 0.04 0.03   Mitogen NIL >10.00 8.9   TB-NIL 0.4 0.79   TB2-NIL 0.74 0.75   Component      Quantiferon TB Gold   NIL   Mitogen NIL   TB-NIL   TB2-NIL       Results for [address] BRUNILDA  \"NANCI\" (MRN [id number]) as of 10/14/2021 10:56   Ref.  Range 5/29/2018 10:07 Session:    Stress:     Feeling of Stress :    Social Connections:     Frequency of Communication with Friends and Family:     Frequency of Social Gatherings with Friends and Family:     Attends Congregational Services:     Active Member of Clubs or Organizations:     Attends Club or Organization Meetings:     Marital Status:    Intimate Partner Violence:     Fear of Current or Ex-Partner:     Emotionally Abused:     Physically Abused:     Sexually Abused:        COVID VACCINATION AND LAB RESULT RECORDS:     Internal Administration   First Dose      Second Dose           Last COVID Lab No results found for: SARS-COV-2, SARS-COV-2 RNA, SARS-COV-2, SARS-COV-2, SARS-COV-2 BY PCR, SARS-COV-2, SARS-COV-2, SARS-COV-2         IMPRESSION:    The patient is a 37 y. o.old male who  has a past medical history of GERD (gastroesophageal reflux disease), Gout, Influenza (02/25/2017), Prediabetes (02/2020), and Vitamin D deficiency (02/2020). was seen today for following problems:    1. LTBI (latent tuberculosis infection)    2. Prediabetes    3. Gastroesophageal reflux disease without esophagitis    4. Class 1 obesity due to excess calories with body mass index (BMI) of 31.0 to 31.9 in adult, unspecified whether serious comorbidity present    5. Encounter for medication counseling    6. Screening for HIV (human immunodeficiency virus)        Discussion:      I reviewed all of patient's previous medical records in detail in [facility] and Care Everywhere    The patient had negative HIV screen on 5/29/2018. He also had a negative RPR negative urine gonorrhea and chlamydia NAAT on that date he has a chronic gastritis and GERD issues for which he follows up with her PCP and also has chronic gout issues    He is unvaccinated against COVID-19. He is a former smoker. The patient had QuantiFERON test done by his PCP on 9/16/2021 which came back positive. He repeated the test on 9/27/2021 and it again came back positive.     The patient had a 2 view chest x-ray done on 9/27/2021 at [facility].  I do not have access to the x-ray image, however, I reviewed the x-ray report through care everywhere and it was reported as a normal chest x-ray. RECOMMENDATIONS:      Diagnostic Workup:    · Will order baseline HIV screen his last pain was 3 years ago  · Continue to follow fever curve at home. Antimicrobials:    · I had a long discussion with the patient today. The patient has no signs of active TB and had a normal chest x-ray. · Explained to the patient about the QuantiFERON test, what it means, what latent tuberculosis infection means and what is the rationale behind treatment  · After a long discussion with the patient, the patient wanted to wait for few months before deciding on starting treatment for latent tuberculosis infection as tuberculosis is not active at this time  · I advised the patient that I will need a baseline CBC, CMP and a repeat chest x-ray whenever he is ready to start the treatment to make sure he does not develop active TB by that time. He understands  · The patient was advised to call my office whenever he is ready to start latent TB infection will be glad to order the above work-up and will plan to start him on latent TB infection treatment accordingly at that time. · Discussed with patient the strategies to stay safe from COVID 19 exposures including safe social distancing, frequent and proper hand hygiene when outside and using 3 layered mouth and nose coverings/facemasks when outside their home.       Labs ordered today inEPIC:    Orders Placed This Encounter   Procedures    HIV Screen     Standing Status:   Future     Standing Expiration Date:   10/14/2022         Patient education and counseling:    · The patient was educated in detail about the side-effects of variousantibiotics and things to watch for like new rashes, lip swelling, severe reaction, worsening diarrhea, break through fever etc.  · Patient was instructed to stop antibiotics and callour office if these problems were to occur, or go to nearest ER if experiencing severe symptoms. · Discussed patient's condition and what to expect. All of the patient's questions wereaddressed in a satisfactory manner and patient verbalized understanding all instructions. Weight loss counseling:    Extensive weight loss counseling was done. It is important to set a realistic weight loss goal. First goal should be to avoid gaining more weight and staying at current weight (or within 5 percent). People at high risk of developing diabetes who are able to lose 5 percent of their body weight and maintain this weight will reduce their risk of developing diabetes by about 50 percent and reduce their blood pressure. Losing more than 15 percent of  body weight and staying at this weight is an extremely good result, even if you never reach your \"dream\" or \"ideal\" weight. Lifestyle changes including changing eating habits, substituting excess carbohydrates with proteins, stress reduction, using self-help programs like Weight Watchers®, Overeaters Anonymous®, and Take Off Pounds Sensibly (TOPS)© , following DASH diet and increasing exercise or walking briskly daily for half hour to and hour 5-7 days a week was suggested among other measures. Information was given about various weight loss education programs and their websites like www.cdc.gov/healthyweight, www.choosemyplate.gov and www.health.gov/dietaryguidelines/    Follow-up:    · Follow-up with me in ID clinic or through video visit  as needed    Peace Lopez is a 37 y.o. male being evaluated by a Virtual Visit encounter to address concerns as mentioned above. A caregiver was present when appropriate.  Due to this being a TeleHealth encounter (During [facility] public health emergency), evaluation of the following organ systems was limited: Vitals/Constitutional/EENT/Resp/CV/GI//MS/Neuro/Skin/Heme-Lymph-Imm. Pursuant to the emergency declaration under the [facility], [address] [place] [address] and the [employer] and [employer] Act, this Virtual Visit was conducted with patient's (and/or legal guardian's) consent, to reduce the patient's risk of exposure to COVID-19 and provide necessary medical care. The patient (and/or legal guardian) has also been advised to contact this office for worsening conditions or problems, and seek emergency medical treatment and/or call 911 if deemed necessary. Services were provided through an audio and/or video synchronous discussion virtually to substitute for in-person clinic visit. Patient and provider were located at their individual homes. --Leighton Carranza MD on 10/14/2021 at 11:41 AM    An electronic signature was used to authenticate this note. TIME SPENTTODAY:     - Spent over 31 minutes on visit (including interval history, physical exam, review of data including labs, cultures, imaging, development and implementation of treatmentplan and coordination of care). - Over 50% of time spent with pt counseling and education. Please note that this chart was generated using Dragon dictation software. Although every effort was made to ensure the accuracy of this automated transcription, some errors in transcription may have occurred inadvertently. If you may need any clarification, please do not hesitate to contact me through EPIC or at the phone number provided below with my electronic signature. Any pictures or media included in this note were obtained after taking informed verbal consent from the patient and with their approval to include those in the patient's medical record. Thankyou for involving me in the care of your patient. If you have any additional questions,please do not hesitate to contact me.     Leighton Carranza MD, MPH  10/14/21 , 10:54 AM EDT   [facility]   [address] [place] [address], [address] [facility], [address] [facility]  Office: [phone]  Fax: [phone]  Clinic days:  Tuesday & Thursday

## 2022-02-01 ENCOUNTER — OFFICE VISIT (OUTPATIENT)
Dept: SURGERY | Age: 42
End: 2022-02-01
Payer: MEDICAID

## 2022-02-01 VITALS — BODY MASS INDEX: 31.54 KG/M2 | DIASTOLIC BLOOD PRESSURE: 68 MMHG | SYSTOLIC BLOOD PRESSURE: 129 MMHG | WEIGHT: 207.4 LBS

## 2022-02-01 DIAGNOSIS — K42.9 UMBILICAL HERNIA WITHOUT OBSTRUCTION AND WITHOUT GANGRENE: Primary | ICD-10-CM

## 2022-02-01 PROCEDURE — G8417 CALC BMI ABV UP PARAM F/U: HCPCS | Performed by: SURGERY

## 2022-02-01 PROCEDURE — 99203 OFFICE O/P NEW LOW 30 MIN: CPT | Performed by: SURGERY

## 2022-02-01 PROCEDURE — 4004F PT TOBACCO SCREEN RCVD TLK: CPT | Performed by: SURGERY

## 2022-02-01 PROCEDURE — G8484 FLU IMMUNIZE NO ADMIN: HCPCS | Performed by: SURGERY

## 2022-02-01 PROCEDURE — G8428 CUR MEDS NOT DOCUMENT: HCPCS | Performed by: SURGERY

## 2022-02-01 ASSESSMENT — ENCOUNTER SYMPTOMS
EYES NEGATIVE: 1
ALLERGIC/IMMUNOLOGIC NEGATIVE: 1
ABDOMINAL PAIN: 1
RESPIRATORY NEGATIVE: 1

## 2022-02-01 NOTE — LETTER
[address] [address]  Phone: [phone]  Fax: [phone]    Andrea Wells MD    February 1, 2022     Daysi Mariee DO  [id number]    Patient: Aisha Jo.   MR Number: [id number]   YOB: 1980   Date of Visit: 2/1/2022       Dear Daysi Mariee: Thank you for referring Vijay Alu to me for evaluation/treatment. Below are the relevant portions of my assessment and plan of care. If you have questions, please do not hesitate to call me. I look forward to following Brannon Ayala along with you.     Sincerely,      Andrea Wells MD

## 2022-02-01 NOTE — PROGRESS NOTES
[facility]        PATIENT NAME: Jessica Gillespie DATE: 2/1/2022    Reason for Visit:  Hernia    Requesting Physician / PCP:  Self / Dr. Gerda Self:              The patient is a 37 y.o. male who presents with concerns of a hernia, notes in the umbilical area, mildly tender at the site, more with pressure and palpation. Has more chronic left upper posterior flank pain, likely unrelated. He has had symptoms for the past several mos. Associated symptoms are no acute N/V/D. No F/C. The patient has not had prior hernia surgery. Past Medical History:        Diagnosis Date    GERD (gastroesophageal reflux disease)     Gout     Influenza 02/25/2017    Prediabetes 02/2020    Vitamin D deficiency 02/2020       Past Surgical History:        Procedure Laterality Date    DENTAL SURGERY      root canal / cracked tooth    OTHER SURGICAL HISTORY      left shoulder - bone spurs    UPPER GASTROINTESTINAL ENDOSCOPY N/A 10/30/2019    EGD BIOPSY performed by Nicola Lemus DO at [facility] EXTRACTION      age late 26s, in [place] [address] [place] [address]       Current Medications:   No current facility-administered medications for this visit. Prior to Admission medications    Medication Sig Start Date End Date Taking?  Authorizing Provider   famotidine (PEPCID) 40 MG tablet Take 40 mg by mouth 2 times daily 9/15/21  Yes Historical Provider, MD   febuxostat (ULORIC) 40 MG TABS tablet Take 1 tablet by mouth daily  Patient not taking: Reported on 10/14/2021 9/8/21   Debbie Cabello DO   indomethacin (INDOCIN) 50 MG capsule Take 1 capsule by mouth 3 times daily 3 times daily until flare resolved  Patient not taking: Reported on 10/14/2021 9/4/21   Vessie Dakin, APRN - CNP   nicotine polacrilex (NICORETTE) 2 MG gum Take 1 each by mouth every hour as needed for Smoking cessation  Patient not taking: Reported on 10/14/2021 8/23/21   Odilia Mathis Miah De Leon DO        Allergies:  Patient has no known allergies. Social History:    reports that he quit smoking about 11 years ago. His smoking use included cigarettes. He quit after 5.00 years of use. His smokeless tobacco use includes chew. He reports previous alcohol use. He reports previous drug use. Family History:        Problem Relation Age of Onset    Alcohol Abuse Father     Stroke Mother     Other Brother         gout    Diabetes Maternal Uncle        REVIEW OF SYSTEMS:  Review of Systems   Constitutional: Positive for activity change. HENT: Negative. Eyes: Negative. Respiratory: Negative. Cardiovascular: Negative. Gastrointestinal: Positive for abdominal pain. Endocrine: Negative. Genitourinary: Negative. Musculoskeletal: Negative. Skin: Negative. Allergic/Immunologic: Negative. Neurological: Negative. Hematological: Negative.         PHYSICAL EXAM:  VITALS:  /68   Wt 207 lb 6.4 oz (94.1 kg)   BMI 31.54 kg/m²   CONSTITUTIONAL:  alert, no apparent distress and mildly overweight  EYES:  sclera clear  ENT:  normocepalic, without obvious abnormality  NECK:  supple, symmetrical, trachea midline  LUNGS:  clear to auscultation  CARDIOVASCULAR:  regular rate and rhythm  ABDOMEN:  no scars, normal bowel sounds, soft, non-distended, tenderness noted mildly at the umbilicus, voluntary guarding absent, no masses palpated, no hepatosplenomegally and hernia present - umbilical  MUSCULOSKELETAL:  0+ edema lower extremities  NEUROLOGIC:  Mental Status Exam:  Level of Alertness:   awake  Orientation:   person, place, time  SKIN:  no bruising or bleeding, normal skin color, texture, turgor and no redness, warmth, or swelling    DATA:      Radiology Review:     EXAMINATION:   CT OF THE ABDOMEN AND PELVIS WITH CONTRAST 4/19/2021 6:21 pm       TECHNIQUE:   CT of the abdomen and pelvis was performed with the administration of   intravenous contrast. Multiplanar reformatted images are provided for review. Dose modulation, iterative reconstruction, and/or weight based adjustment of   the mA/kV was utilized to reduce the radiation dose to as low as reasonably   achievable.       COMPARISON:   None.       HISTORY:   ORDERING SYSTEM PROVIDED HISTORY: Abdominal pain, unspecified abdominal   location   TECHNOLOGIST PROVIDED HISTORY:   Progressively worsening abominal pain radiating to left shoulder pain. Eval   pancreas in particular.       Additional Contrast?->Oral   Reason for Exam: Abdominal pain, unspecified abdominal location R10.9   (ICD-10-CM); [place] R14.2 (ICD-10-CM)   Type of Exam: Initial   Relevant Medical/Surgical History: Progressively worsening abominal pain   radiating to left shoulder pain. Eval pancreas in       FINDINGS:   Lower Chest: There is bibasilar atelectasis.  Coronary artery calcifications   are a marker of atherosclerosis.       Organs: The liver, spleen, pancreas, adrenal glands and kidneys are   unremarkable.       GI/Bowel: There is no bowel obstruction.  The appendix is within normal   limits.       Pelvis: The pelvic viscera are within normal limits.       Peritoneum/Retroperitoneum: There is no evidence of free fluid or adenopathy.       Bones/Soft Tissues: Degenerative changes involve the thoracolumbar spine.           Impression   1. No acute abnormality.             IMPRESSION/RECOMMENDATIONS:    Umbilical hernia without incarceration. Umbilical hernia repair with possible mesh will be considered. Hernia present to my view, but not reported out on prior CT scan. Unlikely related to more longstanding left upper posterior flank CVA area pain. The plan for surgery has been reviewed in detail today. Risks and benefits have been reviewed, and all questions answered. He will think about having surgery, and call us back in a couple days.        Frank Cao MD

## 2022-08-23 ENCOUNTER — HOSPITAL ENCOUNTER (OUTPATIENT)
Dept: MRI IMAGING | Age: 42
Discharge: HOME | End: 2022-08-23

## 2022-08-23 DIAGNOSIS — R10.9 MIGRATORY ABDOMINAL PAIN: ICD-10-CM

## 2022-08-23 DIAGNOSIS — K60.3 PERIANAL FISTULA: ICD-10-CM

## 2022-08-23 DIAGNOSIS — R93.5 ABNORMAL CT OF THE ABDOMEN: ICD-10-CM

## 2022-11-21 ENCOUNTER — HOSPITAL ENCOUNTER (OUTPATIENT)
Dept: MRI IMAGING | Age: 42
Discharge: HOME | End: 2022-11-21
Payer: MEDICAID

## 2022-11-21 DIAGNOSIS — K60.3 PERIANAL FISTULA: ICD-10-CM

## 2022-11-21 DIAGNOSIS — R93.5 ABNORMAL ABDOMINAL ULTRASOUND: ICD-10-CM

## 2022-11-21 DIAGNOSIS — R10.9 MIGRATORY ABDOMINAL PAIN: ICD-10-CM

## 2022-11-21 PROCEDURE — 6360000004 HC RX CONTRAST MEDICATION: Performed by: FAMILY MEDICINE

## 2022-11-21 PROCEDURE — A9579 GAD-BASE MR CONTRAST NOS,1ML: HCPCS | Performed by: FAMILY MEDICINE

## 2022-11-21 PROCEDURE — 74183 MRI ABD W/O CNTR FLWD CNTR: CPT

## 2022-11-21 PROCEDURE — 2500000003 HC RX 250 WO HCPCS: Performed by: STUDENT IN AN ORGANIZED HEALTH CARE EDUCATION/TRAINING PROGRAM

## 2022-11-21 RX ORDER — GLUCAGON 1 MG/ML
1 KIT INJECTION ONCE
Status: COMPLETED | OUTPATIENT
Start: 2022-11-21 | End: 2022-11-21

## 2022-11-21 RX ADMIN — GLUCAGON 1 MG: KIT at 09:57

## 2022-11-21 RX ADMIN — GADOTERIDOL 20 ML: 279.3 INJECTION, SOLUTION INTRAVENOUS at 09:55

## 2022-11-23 ENCOUNTER — HOSPITAL ENCOUNTER (OUTPATIENT)
Dept: MRI IMAGING | Age: 42
Discharge: HOME | End: 2022-11-23
Payer: MEDICAID

## 2022-11-23 DIAGNOSIS — R93.5 ABNORMAL ABDOMINAL ULTRASOUND: ICD-10-CM

## 2022-11-23 DIAGNOSIS — R10.9 MIGRATORY ABDOMINAL PAIN: ICD-10-CM

## 2022-11-23 DIAGNOSIS — K60.3 PERIANAL FISTULA: ICD-10-CM

## 2022-11-23 PROCEDURE — 6360000004 HC RX CONTRAST MEDICATION: Performed by: FAMILY MEDICINE

## 2022-11-23 PROCEDURE — A9577 INJ MULTIHANCE: HCPCS | Performed by: FAMILY MEDICINE

## 2022-11-23 PROCEDURE — 72197 MRI PELVIS W/O & W/DYE: CPT

## 2022-11-23 RX ADMIN — GADOBENATE DIMEGLUMINE 18 ML: 529 INJECTION, SOLUTION INTRAVENOUS at 08:09

## 2023-01-03 ENCOUNTER — HOSPITAL ENCOUNTER (OUTPATIENT)
Dept: NUCLEAR MEDICINE | Age: 43
Discharge: HOME | End: 2023-01-03
Payer: MEDICAID

## 2023-01-03 DIAGNOSIS — R10.9 ABDOMINAL PAIN, UNSPECIFIED ABDOMINAL LOCATION: ICD-10-CM

## 2023-01-03 PROCEDURE — A9537 TC99M MEBROFENIN: HCPCS | Performed by: INTERNAL MEDICINE

## 2023-01-03 PROCEDURE — 3430000000 HC RX DIAGNOSTIC RADIOPHARMACEUTICAL: Performed by: INTERNAL MEDICINE

## 2023-01-03 PROCEDURE — 78227 HEPATOBIL SYST IMAGE W/DRUG: CPT

## 2023-01-03 RX ADMIN — Medication 5.06 MILLICURIE: at 11:28

## 2023-01-13 ENCOUNTER — HOSPITAL ENCOUNTER (EMERGENCY)
Age: 43
Discharge: HOME | End: 2023-01-13
Payer: MEDICAID

## 2023-01-13 ENCOUNTER — APPOINTMENT (OUTPATIENT)
Dept: GENERAL RADIOLOGY | Age: 43
End: 2023-01-13
Payer: MEDICAID

## 2023-01-13 ENCOUNTER — APPOINTMENT (OUTPATIENT)
Dept: CT IMAGING | Age: 43
End: 2023-01-13
Payer: MEDICAID

## 2023-01-13 VITALS
HEIGHT: 68 IN | TEMPERATURE: 98.3 F | WEIGHT: 207 LBS | HEART RATE: 81 BPM | DIASTOLIC BLOOD PRESSURE: 93 MMHG | OXYGEN SATURATION: 99 % | SYSTOLIC BLOOD PRESSURE: 150 MMHG | BODY MASS INDEX: 31.37 KG/M2 | RESPIRATION RATE: 16 BRPM

## 2023-01-13 DIAGNOSIS — Z87.19 HX OF GASTROESOPHAGEAL REFLUX (GERD): ICD-10-CM

## 2023-01-13 DIAGNOSIS — R10.10 UPPER ABDOMINAL PAIN: Primary | ICD-10-CM

## 2023-01-13 LAB
A/G RATIO: 1.5 (ref 1.1–2.2)
ALBUMIN SERPL-MCNC: 4.6 G/DL (ref 3.4–5)
ALP BLD-CCNC: 57 U/L (ref 40–129)
ALT SERPL-CCNC: 20 U/L (ref 10–40)
ANION GAP SERPL CALCULATED.3IONS-SCNC: 11 MMOL/L (ref 3–16)
AST SERPL-CCNC: 20 U/L (ref 15–37)
BACTERIA: NORMAL /HPF
BASOPHILS ABSOLUTE: 0.1 K/UL (ref 0–0.2)
BASOPHILS RELATIVE PERCENT: 0.9 %
BILIRUB SERPL-MCNC: 0.7 MG/DL (ref 0–1)
BILIRUBIN URINE: NEGATIVE
BLOOD, URINE: ABNORMAL
BUN BLDV-MCNC: 10 MG/DL (ref 7–20)
CALCIUM SERPL-MCNC: 9.2 MG/DL (ref 8.3–10.6)
CHLORIDE BLD-SCNC: 103 MMOL/L (ref 99–110)
CLARITY: CLEAR
CO2: 27 MMOL/L (ref 21–32)
COLOR: YELLOW
COMMENT UA: NORMAL
CREAT SERPL-MCNC: 1 MG/DL (ref 0.9–1.3)
EKG ATRIAL RATE: 75 BPM
EKG DIAGNOSIS: NORMAL
EKG P AXIS: 36 DEGREES
EKG P-R INTERVAL: 158 MS
EKG Q-T INTERVAL: 390 MS
EKG QRS DURATION: 98 MS
EKG QTC CALCULATION (BAZETT): 435 MS
EKG R AXIS: 51 DEGREES
EKG T AXIS: 19 DEGREES
EKG VENTRICULAR RATE: 75 BPM
EOSINOPHILS ABSOLUTE: 0.1 K/UL (ref 0–0.6)
EOSINOPHILS RELATIVE PERCENT: 1.5 %
EPITHELIAL CELLS, UA: 0 /HPF (ref 0–5)
GFR SERPL CREATININE-BSD FRML MDRD: >60 ML/MIN/{1.73_M2}
GLUCOSE BLD-MCNC: 111 MG/DL (ref 70–99)
GLUCOSE URINE: NEGATIVE MG/DL
HCT VFR BLD CALC: 44.7 % (ref 40.5–52.5)
HEMOGLOBIN: 15.1 G/DL (ref 13.5–17.5)
HYALINE CASTS: 0 /LPF (ref 0–8)
KETONES, URINE: NEGATIVE MG/DL
LEUKOCYTE ESTERASE, URINE: NEGATIVE
LIPASE: 40 U/L (ref 13–60)
LYMPHOCYTES ABSOLUTE: 1.6 K/UL (ref 1–5.1)
LYMPHOCYTES RELATIVE PERCENT: 26.2 %
MCH RBC QN AUTO: 29.4 PG (ref 26–34)
MCHC RBC AUTO-ENTMCNC: 33.8 G/DL (ref 31–36)
MCV RBC AUTO: 86.9 FL (ref 80–100)
MICROSCOPIC EXAMINATION: YES
MONOCYTES ABSOLUTE: 0.3 K/UL (ref 0–1.3)
MONOCYTES RELATIVE PERCENT: 4.5 %
NEUTROPHILS ABSOLUTE: 4.1 K/UL (ref 1.7–7.7)
NEUTROPHILS RELATIVE PERCENT: 66.9 %
NITRITE, URINE: NEGATIVE
PDW BLD-RTO: 13.6 % (ref 12.4–15.4)
PH UA: 6.5 (ref 5–8)
PLATELET # BLD: 202 K/UL (ref 135–450)
PMV BLD AUTO: 8.2 FL (ref 5–10.5)
POTASSIUM REFLEX MAGNESIUM: 4.2 MMOL/L (ref 3.5–5.1)
PROTEIN UA: NEGATIVE MG/DL
RBC # BLD: 5.15 M/UL (ref 4.2–5.9)
RBC UA: 0 /HPF (ref 0–4)
SODIUM BLD-SCNC: 141 MMOL/L (ref 136–145)
SPECIFIC GRAVITY UA: <=1.005 (ref 1–1.03)
TOTAL PROTEIN: 7.6 G/DL (ref 6.4–8.2)
TROPONIN: <0.01 NG/ML
URINE REFLEX TO CULTURE: ABNORMAL
URINE TYPE: ABNORMAL
UROBILINOGEN, URINE: 0.2 E.U./DL
WBC # BLD: 6.1 K/UL (ref 4–11)
WBC UA: 0 /HPF (ref 0–5)

## 2023-01-13 PROCEDURE — 84484 ASSAY OF TROPONIN QUANT: CPT

## 2023-01-13 PROCEDURE — 6370000000 HC RX 637 (ALT 250 FOR IP): Performed by: PHYSICIAN ASSISTANT

## 2023-01-13 PROCEDURE — 74177 CT ABD & PELVIS W/CONTRAST: CPT

## 2023-01-13 PROCEDURE — 93010 ELECTROCARDIOGRAM REPORT: CPT | Performed by: INTERNAL MEDICINE

## 2023-01-13 PROCEDURE — 85025 COMPLETE CBC W/AUTO DIFF WBC: CPT

## 2023-01-13 PROCEDURE — 81001 URINALYSIS AUTO W/SCOPE: CPT

## 2023-01-13 PROCEDURE — 6360000004 HC RX CONTRAST MEDICATION: Performed by: PHYSICIAN ASSISTANT

## 2023-01-13 PROCEDURE — 99285 EMERGENCY DEPT VISIT HI MDM: CPT

## 2023-01-13 PROCEDURE — 93005 ELECTROCARDIOGRAM TRACING: CPT | Performed by: PHYSICIAN ASSISTANT

## 2023-01-13 PROCEDURE — 80053 COMPREHEN METABOLIC PANEL: CPT

## 2023-01-13 PROCEDURE — 83690 ASSAY OF LIPASE: CPT

## 2023-01-13 PROCEDURE — 71045 X-RAY EXAM CHEST 1 VIEW: CPT

## 2023-01-13 RX ORDER — PANTOPRAZOLE SODIUM 40 MG/1
40 TABLET, DELAYED RELEASE ORAL
COMMUNITY
Start: 2022-11-03

## 2023-01-13 RX ORDER — SUCRALFATE ORAL 1 G/10ML
1 SUSPENSION ORAL 4 TIMES DAILY
Qty: 1200 ML | Refills: 3 | Status: SHIPPED | OUTPATIENT
Start: 2023-01-13

## 2023-01-13 RX ADMIN — ALUMINUM HYDROXIDE, MAGNESIUM HYDROXIDE, AND SIMETHICONE: 200; 200; 20 SUSPENSION ORAL at 10:22

## 2023-01-13 RX ADMIN — IOPAMIDOL 75 ML: 755 INJECTION, SOLUTION INTRAVENOUS at 11:43

## 2023-01-13 ASSESSMENT — PAIN - FUNCTIONAL ASSESSMENT: PAIN_FUNCTIONAL_ASSESSMENT: 0-10

## 2023-01-13 ASSESSMENT — ENCOUNTER SYMPTOMS
COLOR CHANGE: 0
VOMITING: 0
BACK PAIN: 0
ABDOMINAL PAIN: 1
COUGH: 0
CHEST TIGHTNESS: 0
DIARRHEA: 0
SHORTNESS OF BREATH: 0
NAUSEA: 0
RESPIRATORY NEGATIVE: 1
CONSTIPATION: 0

## 2023-01-13 ASSESSMENT — PAIN DESCRIPTION - LOCATION: LOCATION: ABDOMEN

## 2023-01-13 ASSESSMENT — PAIN DESCRIPTION - ORIENTATION: ORIENTATION: UPPER

## 2023-01-13 ASSESSMENT — PAIN SCALES - GENERAL: PAINLEVEL_OUTOF10: 4

## 2023-01-13 ASSESSMENT — LIFESTYLE VARIABLES: HOW OFTEN DO YOU HAVE A DRINK CONTAINING ALCOHOL: NEVER

## 2023-01-13 NOTE — ED PROVIDER NOTES
[address] ENCOUNTER        Pt Name: Madhuri Romo MRN: [id number]  Birthdate 1980  Date of evaluation: 1/13/2023  Provider: JOANA Campo  PCP: Meño Antunez. Autumn Thorne MD, MD  Note Started: 11:43 AM EST 1/13/23      HA. I have evaluated this patient. My supervising physician was available for consultation. CHIEF COMPLAINT       Chief Complaint   Patient presents with    Abdominal Pain     Abdominal pain onset this morning, similar episode 2 days ago. Hx GERD. Endorses nausea and headache, denies vomiting or diarrhea. HISTORY OF PRESENT ILLNESS: 1 or more Elements     History From: Patient  Limitations to history : None    Madhuri Romo is a 40 y.o. male with past medical history of gout, prediabetes and GERD who presents to the ED with complaint of abdominal pain. Patient states 2 days ago he developed pain to his epigastric abdomen that radiated to his chest.  He states felt similar to previous episodes of GERD he had in the past.  Patient states he is on medication for his GERD with pantoprazole. Patient states he has been taking as prescribed. Patient states he called his PCP who told him if he should develop similar symptoms to go to the emergency department for concern for potential gallbladder etiology. He states he is followed up with GI in the past and states he actually just recently had HIDA scan last week. Patient states he had recurrent episode this morning and states he called GI and he states he spoke to the MA at the GI office they told him that his HIDA scan was high and he needed to go to the emergency department for evaluation of potential gallbladder etiology. Patient denies any shortness of breath, cough, pleuritic pain or orthopnea. Denies any nausea, vomiting, urinary symptoms or changes in bowel movements. Denies fever or chills. Denies any rashes or lesions.   Denies urinary symptoms or changes in bowel movements. He denies any surgeries to his abdomen in the past.    Nursing Notes were all reviewed and agreed with or any disagreements were addressed in the HPI. REVIEW OF SYSTEMS :      Review of Systems   Constitutional:  Negative for activity change, appetite change, chills, diaphoresis, fatigue and fever. Respiratory: Negative. Negative for cough, chest tightness and shortness of breath. Cardiovascular: Negative. Negative for chest pain, palpitations and leg swelling. Gastrointestinal:  Positive for abdominal pain. Negative for constipation, diarrhea, nausea and vomiting. Genitourinary:  Negative for decreased urine volume, difficulty urinating, dysuria, flank pain, frequency, hematuria and urgency. Musculoskeletal:  Negative for arthralgias, back pain, myalgias, neck pain and neck stiffness. Skin:  Negative for color change, pallor, rash and wound. Neurological:  Negative for dizziness, light-headedness and headaches. Positives and Pertinent negatives as per HPI.      SURGICAL HISTORY     Past Surgical History:   Procedure Laterality Date    DENTAL SURGERY      root canal / cracked tooth    OTHER SURGICAL HISTORY      left shoulder - bone spurs    UPPER GASTROINTESTINAL ENDOSCOPY N/A 10/30/2019    EGD BIOPSY performed by Alex Lamb DO at [facility]      age late 25s, in [facility] [address]       Previous Medications    FAMOTIDINE (PEPCID) 40 MG TABLET    Take 40 mg by mouth 2 times daily    FEBUXOSTAT (ULORIC) 40 MG TABS TABLET    Take 1 tablet by mouth daily    INDOMETHACIN (INDOCIN) 50 MG CAPSULE    Take 1 capsule by mouth 3 times daily 3 times daily until flare resolved    NICOTINE POLACRILEX (NICORETTE) 2 MG GUM    Take 1 each by mouth every hour as needed for Smoking cessation    PANTOPRAZOLE (PROTONIX) 40 MG TABLET    Take 40 mg by mouth every morning (before breakfast)       ALLERGIES     Patient has no known allergies. FAMILYHISTORY       Family History   Problem Relation Age of Onset    Alcohol Abuse Father     Stroke Mother     Other Brother         gout    Diabetes Maternal Uncle         SOCIAL HISTORY       Social History     Tobacco Use    Smoking status: Former     Years: 5.00     Types: Cigarettes     Quit date: 2010     Years since quittin.3    Smokeless tobacco: Current     Types: Chew    Tobacco comments:     pt. states he stopped a couple of days ago   Vaping Use    Vaping Use: Never used   Substance Use Topics    Alcohol use: Not Currently     Comment: binge drinker on weekends    Drug use: Not Currently       SCREENINGS        Virgie Coma Scale  Eye Opening: Spontaneous  Best Verbal Response: Oriented  Best Motor Response: Obeys commands  [employer] Coma Scale Score: 15                CIWA Assessment  BP: (!) 150/93  Heart Rate: 81           PHYSICAL EXAM  1 or more Elements     ED Triage Vitals [23 1003]   BP Temp Temp Source Heart Rate Resp SpO2 Height Weight   (!) 150/93 98.3 °F (36.8 °C) Oral 81 16 99 % 5' 8\" (1.727 m) 207 lb (93.9 kg)       Physical Exam  Constitutional:       General: He is not in acute distress. Appearance: He is well-developed. He is not ill-appearing, toxic-appearing or diaphoretic. HENT:      Head: Normocephalic and atraumatic. Right Ear: External ear normal.      Left Ear: External ear normal.   Eyes:      General:         Right eye: No discharge. Left eye: No discharge. Cardiovascular:      Rate and Rhythm: Normal rate and regular rhythm. Pulses: Normal pulses. Heart sounds: Normal heart sounds. No murmur heard. No friction rub. No gallop. Comments: 2+ radial pulses bilaterally. No pedal edema. No calf tenderness. No JVD  Pulmonary:      Effort: Pulmonary effort is normal. No respiratory distress. Breath sounds: Normal breath sounds. No stridor. No wheezing, rhonchi or rales. Chest:      Chest wall: No tenderness. Abdominal:      General: Abdomen is flat. Bowel sounds are normal. There is no distension. Palpations: Abdomen is soft. There is no mass. Tenderness: There is abdominal tenderness in the epigastric area. There is no right CVA tenderness, left CVA tenderness, guarding or rebound. Negative signs include Zhao's sign, Rovsing's sign and McBurney's sign. Hernia: No hernia is present. Musculoskeletal:         General: Normal range of motion. Cervical back: Normal range of motion and neck supple. Skin:     General: Skin is warm and dry. Coloration: Skin is not pale. Findings: No erythema or rash. Neurological:      Mental Status: He is alert and oriented to person, place, and time. Psychiatric:         Behavior: Behavior normal.           DIAGNOSTIC RESULTS   LABS:    Labs Reviewed   COMPREHENSIVE METABOLIC PANEL W/ REFLEX TO MG FOR LOW K - Abnormal; Notable for the following components:       Result Value    Glucose 111 (*)     All other components within normal limits   URINALYSIS WITH REFLEX TO CULTURE - Abnormal; Notable for the following components:    Blood, Urine TRACE (*)     All other components within normal limits   TROPONIN   CBC WITH AUTO DIFFERENTIAL   LIPASE   MICROSCOPIC URINALYSIS       When ordered only abnormal lab results are displayed. All other labs were within normal range or not returned as of this dictation. EKG: When ordered, EKG's are interpreted by the Emergency Department Physician in the absence of a cardiologist.  Please see their note for interpretation of EKG.     RADIOLOGY:   Non-plain film images such as CT, Ultrasound and MRI are read by the radiologist. Plain radiographic images are visualized and preliminarily interpreted by the ED Provider with the below findings:        Interpretation per the Radiologist below, if available at the time of this note:    CT ABDOMEN PELVIS W IV CONTRAST Additional Contrast? None   Final Result   No acute abdominopelvic abnormality. XR CHEST PORTABLE   Final Result   No acute disease           XR CHEST PORTABLE    Result Date: 1/13/2023  EXAMINATION: ONE XRAY VIEW OF THE CHEST 1/13/2023 10:35 am COMPARISON: 07/03/2021 HISTORY: ORDERING SYSTEM PROVIDED HISTORY: abd pain - cp TECHNOLOGIST PROVIDED HISTORY: Reason for exam:->abd pain - cp Reason for Exam: Abdominal Pain (Abdominal pain onset this morning, similar episode 2 days ago. Hx GERD. Endorses nausea and headache, denies vomiting or diarrhea.) FINDINGS: Heart size is normal.  Mediastinal contours are normal.  Pulmonary vascularity is normal.  No focal lung consolidation noted     No acute disease       No results found. PROCEDURES   Unless otherwise noted below, none     Procedures    CRITICAL CARE TIME (.cctime)       PAST MEDICAL HISTORY      has a past medical history of GERD (gastroesophageal reflux disease), Gout, Influenza (02/25/2017), Prediabetes (02/2020), and Vitamin D deficiency (02/2020). EMERGENCY DEPARTMENT COURSE and DIFFERENTIAL DIAGNOSIS/MDM:   Vitals:    Vitals:    01/13/23 1003   BP: (!) 150/93   Pulse: 81   Resp: 16   Temp: 98.3 °F (36.8 °C)   TempSrc: Oral   SpO2: 99%   Weight: 207 lb (93.9 kg)   Height: 5' 8\" (1.727 m)       Patient was given the following medications:  Medications   aluminum & magnesium hydroxide-simethicone (MAALOX) 30 mL, lidocaine viscous hcl (XYLOCAINE) 5 mL (GI COCKTAIL) ( Oral Given 1/13/23 1022)   iopamidol (ISOVUE-370) 76 % injection 75 mL (75 mLs IntraVENous Given 1/13/23 1143)             Is this patient to be included in the SEP-1 Core Measure due to severe sepsis or septic shock? No   Exclusion criteria - the patient is NOT to be included for SEP-1 Core Measure due to:   Infection is not suspected    Chronic Conditions affecting care:    has a past medical history of GERD (gastroesophageal reflux disease), Gout, Influenza (02/25/2017), Prediabetes (02/2020), and Vitamin D deficiency (02/2020). CONSULTS: (Who and What was discussed)  None      Social Determinants : None    Records Reviewed (Source): HIDA Scan Results    CC/HPI Summary, DDx, ED Course, and Reassessment: Patient is a 50-year-old male who presents the ED with complaint of upper abdominal pain. Upper abdominal pain that radiates into the chest he states worsened after eating. Has history of GERD and states current symptoms feel similar. Recently had HIDA scan by GI on 1/3/2023 which showed normal study with ejection fraction documented at 92%. He states he has had recurrent pain of epigastric abdominal pain and his PCP was concerned for gallbladder etiology. He states he called GI they told him to come to the emergency department for further evaluation and treatment. His HIDA scan appears to be normal just done 10 days ago and low sufficient for gallbladder etiology at this time. Concern is symptoms are related to GERD/gastritis especially in the 5 he states that he worsen when he eats. He has been taking pantoprazole at home. He was given GI cocktail here in the emergency department with complete resolution of symptoms. EKG and troponin obtained and relatively unremarkable. EKG interpreted by attending. Given reassuring EKG and troponin with ongoing symptoms for past several days doubt ACS or cardiac etiology at this time. He has no pleuritic pain is not tachycardic, tachypneic or hypoxic. Doubt PE.  CMP showed normal LFTs and electrolytes. Lipase was normal arguing against pancreatitis. Urinalysis showed no signs infection or blood arguing against pyelonephritis or nephrolithiasis. He has had normal HIDA scan and doubt gallbladder etiology but given his pain did obtain CT to rule out other potential etiologies including appendicitis, hernia, pancreatitis or obstruction. CT showed no acute abnormality.   Given reassuring CT doubt obstruction, pancreatitis, peritonitis, perforation, cholecystitis, appendicitis, diverticulitis, colitis, mesenteric ischemia, AAA, dissection or other emergent allergy this time. Believe we safely discharge home with close outpatient follow-up with his GI provider. We will give prescription for Carafate slurry. Instructions to take pantoprazole as prescribed. Instructed on avoiding spicy foods which he states normally set off his symptoms. Instructions to avoid NSAIDs and coffee. Instructed on bland diet. I am the Primary Clinician of Record. FINAL IMPRESSION      1. Upper abdominal pain    2. Hx of gastroesophageal reflux (GERD)          DISPOSITION/PLAN     DISPOSITION Decision To Discharge 01/13/2023 12:27:00 PM      PATIENT REFERRED TO:  Your GI Specialist    Schedule an appointment as soon as possible for a visit   For a Re-check in 5-7     days.     DISCHARGE MEDICATIONS:  New Prescriptions    SUCRALFATE (CARAFATE) 1 GM/10ML SUSPENSION    Take 10 mLs by mouth 4 times daily       DISCONTINUED MEDICATIONS:  Discontinued Medications    No medications on file              (Please note that portions of this note were completed with a voice recognition program.  Efforts were made to edit the dictations but occasionally words are mis-transcribed.)    JOANA Storey Cap (electronically signed)       JOANA Calhoun  01/13/23 5064

## 2023-01-13 NOTE — LETTER
[facility] Emergency Department  [address] [facility], [address]             January 13, 2023    Patient: Tiff Alcantar. YOB: 1980   Date of Visit: 1/13/2023       To Whom It May Concern:    Zonia Vann was seen and treated in our emergency department on 1/13/2023.  He may return to work on Monday 1/16/2023      Sincerely,         [id number] [facility] ER

## 2023-01-13 NOTE — ED PROVIDER NOTES
Patient was seen and evaluated independently by HA. I was immediately available for consultation if needed. This note is provided for EKG interpretation only.     EKG:    EKG was reviewed by emergency department physician in the absence of a cardiologist    Narrow complex sinus rhythm, rate 75, normal axis, normal HI and QRS intervals, normal Qtc, no ST elevations or depressions, normal t-wave morphology, impression NSR, no STEMI       Clifton Mcfarland DO  01/13/23 2479

## 2023-10-05 ENCOUNTER — HOSPITAL ENCOUNTER (OUTPATIENT)
Age: 43
Discharge: HOME | End: 2023-10-05
Payer: MEDICAID

## 2023-10-05 LAB
ALBUMIN SERPL-MCNC: 4.6 G/DL (ref 3.4–5)
ALBUMIN/GLOB SERPL: 1.6 {RATIO} (ref 1.1–2.2)
ALP SERPL-CCNC: 53 U/L (ref 40–129)
ALT SERPL-CCNC: 14 U/L (ref 10–40)
ANION GAP SERPL CALCULATED.3IONS-SCNC: 11 MMOL/L (ref 3–16)
AST SERPL-CCNC: 17 U/L (ref 15–37)
BILIRUB SERPL-MCNC: 0.4 MG/DL (ref 0–1)
BILIRUB UR QL STRIP.AUTO: NEGATIVE
BUN SERPL-MCNC: 14 MG/DL (ref 7–20)
CALCIUM SERPL-MCNC: 8.9 MG/DL (ref 8.3–10.6)
CHLORIDE SERPL-SCNC: 101 MMOL/L (ref 99–110)
CLARITY UR: CLEAR
CO2 SERPL-SCNC: 26 MMOL/L (ref 21–32)
COLOR UR: YELLOW
CREAT SERPL-MCNC: 1 MG/DL (ref 0.9–1.3)
GFR SERPLBLD CREATININE-BSD FMLA CKD-EPI: >60 ML/MIN/{1.73_M2}
GLUCOSE SERPL-MCNC: 103 MG/DL (ref 70–99)
GLUCOSE UR STRIP.AUTO-MCNC: NEGATIVE MG/DL
HGB UR QL STRIP.AUTO: NEGATIVE
KETONES UR STRIP.AUTO-MCNC: NEGATIVE MG/DL
LEUKOCYTE ESTERASE UR QL STRIP.AUTO: NEGATIVE
LIPASE SERPL-CCNC: 43 U/L (ref 13–60)
NITRITE UR QL STRIP.AUTO: NEGATIVE
PH UR STRIP.AUTO: 5.5 [PH] (ref 5–8)
POTASSIUM SERPL-SCNC: 4.5 MMOL/L (ref 3.5–5.1)
PROT SERPL-MCNC: 7.4 G/DL (ref 6.4–8.2)
PROT UR STRIP.AUTO-MCNC: NEGATIVE MG/DL
SODIUM SERPL-SCNC: 138 MMOL/L (ref 136–145)
SP GR UR STRIP.AUTO: 1.02 (ref 1–1.03)
UA DIPSTICK W REFLEX MICRO PNL UR: NORMAL
URN SPEC COLLECT METH UR: NORMAL
UROBILINOGEN UR STRIP-ACNC: 0.2 E.U./DL

## 2023-10-05 PROCEDURE — 81003 URINALYSIS AUTO W/O SCOPE: CPT

## 2023-10-05 PROCEDURE — 80053 COMPREHEN METABOLIC PANEL: CPT

## 2023-10-05 PROCEDURE — 83690 ASSAY OF LIPASE: CPT

## 2024-02-07 ENCOUNTER — HOSPITAL ENCOUNTER (OUTPATIENT)
Dept: GENERAL RADIOLOGY | Age: 44
Discharge: HOME | End: 2024-02-07
Attending: INTERNAL MEDICINE
Payer: COMMERCIAL

## 2024-02-07 DIAGNOSIS — K21.9 GASTROESOPHAGEAL REFLUX DISEASE WITHOUT ESOPHAGITIS: ICD-10-CM

## 2024-02-07 PROCEDURE — 74220 X-RAY XM ESOPHAGUS 1CNTRST: CPT

## 2024-02-12 ENCOUNTER — HOSPITAL ENCOUNTER (OUTPATIENT)
Dept: ENDOSCOPY | Age: 44
Discharge: HOME | End: 2024-02-12
Payer: COMMERCIAL

## 2024-02-12 PROCEDURE — 3609015500 HC GASTRIC/DUODENAL MOTILITY &/OR MANOMETRY STUDY

## 2024-02-12 NOTE — PROGRESS NOTES
Attempted to insert manometry probe into right nostril. Probe looped in hernia multiple times and unable to properly place probe.    Spoke with patient about other options. Notified Dr. Qureshi.     Time in Room: 1303   Patient verbalized understanding of Esophageal manometry and pH study.   PH probe inserted into right nostril with no resistance. Patient left apt with pH probe in place.    Discharge instructions given. Patient denied further questions, nausea or pain.  Walked to exit by this RN.  Time out of Room: 8289     Electronically signed by Kassidy Quintero RN on 2/12/2024 at 2:25 PM

## 2024-03-04 ENCOUNTER — HOSPITAL ENCOUNTER (OUTPATIENT)
Dept: ULTRASOUND IMAGING | Age: 44
Discharge: HOME | End: 2024-03-04
Attending: INTERNAL MEDICINE
Payer: COMMERCIAL

## 2024-03-04 DIAGNOSIS — R07.9 CHEST PAIN, UNSPECIFIED TYPE: ICD-10-CM

## 2024-03-04 PROCEDURE — 76705 ECHO EXAM OF ABDOMEN: CPT

## 2024-05-18 ENCOUNTER — HOSPITAL ENCOUNTER (EMERGENCY)
Age: 44
Discharge: HOME | End: 2024-05-18
Attending: EMERGENCY MEDICINE
Payer: COMMERCIAL

## 2024-05-18 VITALS
DIASTOLIC BLOOD PRESSURE: 82 MMHG | TEMPERATURE: 98.2 F | RESPIRATION RATE: 18 BRPM | HEART RATE: 70 BPM | SYSTOLIC BLOOD PRESSURE: 126 MMHG | OXYGEN SATURATION: 98 %

## 2024-05-18 DIAGNOSIS — Z87.39 HISTORY OF ACUTE GOUTY ARTHRITIS: ICD-10-CM

## 2024-05-18 DIAGNOSIS — M25.522 LEFT ELBOW PAIN: Primary | ICD-10-CM

## 2024-05-18 PROCEDURE — 99283 EMERGENCY DEPT VISIT LOW MDM: CPT

## 2024-05-18 PROCEDURE — 6370000000 HC RX 637 (ALT 250 FOR IP): Performed by: EMERGENCY MEDICINE

## 2024-05-18 RX ORDER — OXYCODONE HYDROCHLORIDE 5 MG/1
5 TABLET ORAL EVERY 8 HOURS PRN
Qty: 7 TABLET | Refills: 0 | Status: SHIPPED | OUTPATIENT
Start: 2024-05-18 | End: 2024-05-25

## 2024-05-18 RX ORDER — LIDOCAINE 4 G/G
1 PATCH TOPICAL
Qty: 5 EACH | Refills: 0 | Status: SHIPPED | OUTPATIENT
Start: 2024-05-18 | End: 2024-05-23

## 2024-05-18 RX ORDER — ACETAMINOPHEN 500 MG
500 TABLET ORAL ONCE
Status: COMPLETED | OUTPATIENT
Start: 2024-05-18 | End: 2024-05-18

## 2024-05-18 RX ORDER — PREDNISONE 50 MG/1
50 TABLET ORAL DAILY
Qty: 4 TABLET | Refills: 0 | Status: SHIPPED | OUTPATIENT
Start: 2024-05-18 | End: 2024-05-22

## 2024-05-18 RX ORDER — LIDOCAINE 4 G/G
2 PATCH TOPICAL ONCE
Status: DISCONTINUED | OUTPATIENT
Start: 2024-05-18 | End: 2024-05-18 | Stop reason: HOSPADM

## 2024-05-18 RX ORDER — PREDNISONE 20 MG/1
60 TABLET ORAL ONCE
Status: COMPLETED | OUTPATIENT
Start: 2024-05-18 | End: 2024-05-18

## 2024-05-18 RX ORDER — ACETAMINOPHEN 500 MG
1000 TABLET ORAL 3 TIMES DAILY
Qty: 30 TABLET | Refills: 0 | Status: SHIPPED | OUTPATIENT
Start: 2024-05-18 | End: 2024-05-23

## 2024-05-18 RX ADMIN — ACETAMINOPHEN 500 MG: 500 TABLET ORAL at 05:44

## 2024-05-18 RX ADMIN — PREDNISONE 60 MG: 20 TABLET ORAL at 05:44

## 2024-05-18 ASSESSMENT — PAIN SCALES - GENERAL: PAINLEVEL_OUTOF10: 10

## 2024-05-18 ASSESSMENT — PAIN DESCRIPTION - LOCATION: LOCATION: ELBOW

## 2024-05-18 ASSESSMENT — LIFESTYLE VARIABLES
HOW MANY STANDARD DRINKS CONTAINING ALCOHOL DO YOU HAVE ON A TYPICAL DAY: PATIENT DOES NOT DRINK
HOW OFTEN DO YOU HAVE A DRINK CONTAINING ALCOHOL: NEVER

## 2024-05-18 ASSESSMENT — PAIN - FUNCTIONAL ASSESSMENT: PAIN_FUNCTIONAL_ASSESSMENT: 0-10

## 2024-05-18 ASSESSMENT — PAIN DESCRIPTION - ORIENTATION: ORIENTATION: LEFT

## 2024-05-18 NOTE — DISCHARGE INSTRUCTIONS
Your prescription has been sent to [facility].    Be sure to contact your primary care provider's office to arrange for a follow up visit.    If you have any new or worsening issues after going home don't hesitate to return here for reevaluation at any time 24/7!

## 2024-05-23 ASSESSMENT — ENCOUNTER SYMPTOMS
SHORTNESS OF BREATH: 0
ABDOMINAL PAIN: 0
NAUSEA: 0
COLOR CHANGE: 0
VOMITING: 0

## 2024-05-23 NOTE — ED PROVIDER NOTES
[facility] EMERGENCY DEPARTMENT    Name: Peng Knox Jr. : 1980 MRN: [id number] Date of Service: 2024    Initial VS: BP: 126/82, Temp: 98.2 °F (36.8 °C), Pulse: 70, Respirations: 18, SpO2: 98 %     CC: elbow pain    HPI: this patient is a 44 y.o. male presenting to the ED from home. Mr. Knox tells me that in recent days he has been struggling with increasing pain all throughout his left elbow. This pain has remained fairly constant without appreciable inciting or alleviating factors. He has tried taking some over-the-counter analgesic medication without significant relief of his pain. As his condition did not seem to be improving over time he came here this morning to be evaluated. He notes that he suffers with frequent bouts of \"gout\" and this feels identical to prior episodes of gouty arthritis affecting his left elbow. He has not appreciated other changes from his usual state of health and he denies other current complaints.  _____________________________________________________________________    Past Medical History:   Diagnosis Date    Class 1 obesity     GERD (gastroesophageal reflux disease)     Gouty arthritis     Prediabetes 2020    Tobacco use disorder     Vitamin D deficiency 2020      Past Surgical History:   Procedure Laterality Date    CHOLECYSTECTOMY      DENTAL SURGERY      SHOULDER SURGERY      UPPER GASTROINTESTINAL ENDOSCOPY N/A 10/30/2019    EGD BIOPSY performed by Bimal Page DO at [facility] ENDOSCOPY    WISDOM TOOTH EXTRACTION       Family History   Problem Relation Age of Onset    Stroke Mother     Other Father         Alcohol use disorder    Diabetes Maternal Uncle      Social History     Tobacco Use    Smoking status: Former     Current packs/day: 1.00     Average packs/day: 1 pack/day for 5.0 years (5.0 ttl pk-yrs)     Types: Cigarettes    Smokeless tobacco: Current     Types: Chew   Vaping Use    Vaping Use: Never used   Substance Use Topics    Alcohol use: Yes

## 2024-06-05 ENCOUNTER — APPOINTMENT (OUTPATIENT)
Dept: CT IMAGING | Age: 44
End: 2024-06-05
Payer: COMMERCIAL

## 2024-06-05 ENCOUNTER — HOSPITAL ENCOUNTER (EMERGENCY)
Age: 44
Discharge: HOME | End: 2024-06-05
Payer: COMMERCIAL

## 2024-06-05 VITALS
RESPIRATION RATE: 18 BRPM | DIASTOLIC BLOOD PRESSURE: 77 MMHG | OXYGEN SATURATION: 97 % | WEIGHT: 215 LBS | HEIGHT: 68 IN | TEMPERATURE: 97.9 F | HEART RATE: 53 BPM | SYSTOLIC BLOOD PRESSURE: 131 MMHG | BODY MASS INDEX: 32.58 KG/M2

## 2024-06-05 DIAGNOSIS — R51.9 ACUTE INTRACTABLE HEADACHE, UNSPECIFIED HEADACHE TYPE: ICD-10-CM

## 2024-06-05 DIAGNOSIS — R42 VERTIGO: Primary | ICD-10-CM

## 2024-06-05 LAB
ALBUMIN SERPL-MCNC: 4.3 G/DL (ref 3.4–5)
ALBUMIN/GLOB SERPL: 1.4 {RATIO} (ref 1.1–2.2)
ALP SERPL-CCNC: 56 U/L (ref 40–129)
ALT SERPL-CCNC: 22 U/L (ref 10–40)
ANION GAP SERPL CALCULATED.3IONS-SCNC: 13 MMOL/L (ref 3–16)
AST SERPL-CCNC: 17 U/L (ref 15–37)
BASOPHILS # BLD: 0 K/UL (ref 0–0.2)
BASOPHILS NFR BLD: 0.5 %
BILIRUB SERPL-MCNC: 0.5 MG/DL (ref 0–1)
BUN SERPL-MCNC: 15 MG/DL (ref 7–20)
CALCIUM SERPL-MCNC: 9.1 MG/DL (ref 8.3–10.6)
CHLORIDE SERPL-SCNC: 101 MMOL/L (ref 99–110)
CO2 SERPL-SCNC: 22 MMOL/L (ref 21–32)
CREAT SERPL-MCNC: 1 MG/DL (ref 0.9–1.3)
DEPRECATED RDW RBC AUTO: 13.1 % (ref 12.4–15.4)
EOSINOPHIL # BLD: 0.2 K/UL (ref 0–0.6)
EOSINOPHIL NFR BLD: 2.2 %
GFR SERPLBLD CREATININE-BSD FMLA CKD-EPI: >90 ML/MIN/{1.73_M2}
GLUCOSE SERPL-MCNC: 138 MG/DL (ref 70–99)
HCT VFR BLD AUTO: 43.4 % (ref 40.5–52.5)
HGB BLD-MCNC: 15.2 G/DL (ref 13.5–17.5)
LYMPHOCYTES # BLD: 2.4 K/UL (ref 1–5.1)
LYMPHOCYTES NFR BLD: 30.3 %
MCH RBC QN AUTO: 30.4 PG (ref 26–34)
MCHC RBC AUTO-ENTMCNC: 35.1 G/DL (ref 31–36)
MCV RBC AUTO: 86.7 FL (ref 80–100)
MONOCYTES # BLD: 0.2 K/UL (ref 0–1.3)
MONOCYTES NFR BLD: 2.9 %
NEUTROPHILS # BLD: 5.1 K/UL (ref 1.7–7.7)
NEUTROPHILS NFR BLD: 64.1 %
PLATELET # BLD AUTO: 172 K/UL (ref 135–450)
PMV BLD AUTO: 8.2 FL (ref 5–10.5)
POTASSIUM SERPL-SCNC: 3.8 MMOL/L (ref 3.5–5.1)
PROT SERPL-MCNC: 7.4 G/DL (ref 6.4–8.2)
RBC # BLD AUTO: 5 M/UL (ref 4.2–5.9)
SODIUM SERPL-SCNC: 136 MMOL/L (ref 136–145)
TROPONIN, HIGH SENSITIVITY: <6 NG/L (ref 0–22)
WBC # BLD AUTO: 8 K/UL (ref 4–11)

## 2024-06-05 PROCEDURE — 85025 COMPLETE CBC W/AUTO DIFF WBC: CPT

## 2024-06-05 PROCEDURE — 70450 CT HEAD/BRAIN W/O DYE: CPT

## 2024-06-05 PROCEDURE — 93005 ELECTROCARDIOGRAM TRACING: CPT | Performed by: PHYSICIAN ASSISTANT

## 2024-06-05 PROCEDURE — 6360000004 HC RX CONTRAST MEDICATION: Performed by: PHYSICIAN ASSISTANT

## 2024-06-05 PROCEDURE — 6370000000 HC RX 637 (ALT 250 FOR IP): Performed by: PHYSICIAN ASSISTANT

## 2024-06-05 PROCEDURE — 80053 COMPREHEN METABOLIC PANEL: CPT

## 2024-06-05 PROCEDURE — 84484 ASSAY OF TROPONIN QUANT: CPT

## 2024-06-05 PROCEDURE — 99285 EMERGENCY DEPT VISIT HI MDM: CPT

## 2024-06-05 PROCEDURE — 70498 CT ANGIOGRAPHY NECK: CPT

## 2024-06-05 RX ORDER — ONDANSETRON 4 MG/1
4 TABLET, ORALLY DISINTEGRATING ORAL ONCE
Status: COMPLETED | OUTPATIENT
Start: 2024-06-05 | End: 2024-06-05

## 2024-06-05 RX ORDER — MECLIZINE HYDROCHLORIDE 25 MG/1
25 TABLET ORAL 3 TIMES DAILY PRN
Qty: 15 TABLET | Refills: 0 | Status: SHIPPED | OUTPATIENT
Start: 2024-06-05 | End: 2024-06-15

## 2024-06-05 RX ORDER — MECLIZINE HCL 12.5 MG/1
25 TABLET ORAL ONCE
Status: COMPLETED | OUTPATIENT
Start: 2024-06-05 | End: 2024-06-05

## 2024-06-05 RX ORDER — ACETAMINOPHEN 500 MG
1000 TABLET ORAL
Status: COMPLETED | OUTPATIENT
Start: 2024-06-05 | End: 2024-06-05

## 2024-06-05 RX ORDER — BUTALBITAL, ACETAMINOPHEN AND CAFFEINE 50; 325; 40 MG/1; MG/1; MG/1
1 TABLET ORAL EVERY 6 HOURS PRN
Qty: 20 TABLET | Refills: 0 | Status: SHIPPED | OUTPATIENT
Start: 2024-06-05

## 2024-06-05 RX ORDER — ONDANSETRON 4 MG/1
4 TABLET, ORALLY DISINTEGRATING ORAL 3 TIMES DAILY PRN
Qty: 12 TABLET | Refills: 0 | Status: SHIPPED | OUTPATIENT
Start: 2024-06-05

## 2024-06-05 RX ADMIN — MECLIZINE 25 MG: 12.5 TABLET ORAL at 17:03

## 2024-06-05 RX ADMIN — ONDANSETRON 4 MG: 4 TABLET, ORALLY DISINTEGRATING ORAL at 17:04

## 2024-06-05 RX ADMIN — IOPAMIDOL 75 ML: 755 INJECTION, SOLUTION INTRAVENOUS at 17:47

## 2024-06-05 RX ADMIN — ACETAMINOPHEN 1000 MG: 500 TABLET ORAL at 17:03

## 2024-06-05 ASSESSMENT — PAIN DESCRIPTION - LOCATION: LOCATION: HEAD

## 2024-06-05 ASSESSMENT — PAIN - FUNCTIONAL ASSESSMENT: PAIN_FUNCTIONAL_ASSESSMENT: 0-10

## 2024-06-05 ASSESSMENT — PAIN SCALES - GENERAL: PAINLEVEL_OUTOF10: 0

## 2024-06-05 NOTE — ED PROVIDER NOTES
I was available for consultation during patient's ED stay.  Patient was cared for by HA.  I did not evaluate or participate in patient care.    EKG Interpretation    Interpreted by emergency department physician    Rhythm: normal sinus   Rate: normal  Axis: normal  Ectopy: none  Conduction: normal  ST Segments: normal  T Waves: normal  Q Waves: none    Clinical Impression: Normal sinus rhythm, no significant T wave or ST changes.  Normal HI interval, normal QRS duration, normal QT QTC.  Normal axis.  No arrhythmia or signs of ischemia.  Otherwise normal EKG. Interpreted by myself.          MD Alverto Colbert Stephen W, MD  06/05/24 0196    
FOR LOW K - Abnormal; Notable for the following components:       Result Value    Glucose 138 (*)     All other components within normal limits   CBC WITH AUTO DIFFERENTIAL   TROPONIN       When ordered only abnormal lab results are displayed. All other labs were within normal range or not returned as of this dictation.    EKG: When ordered, EKG's are interpreted by the Emergency Department Physician in the absence of a cardiologist.  Please see their note for interpretation of EKG.    RADIOLOGY:   Non-plain film images such as CT, Ultrasound and MRI are read by the radiologist. Plain radiographic images are visualized and preliminarily interpreted by the ED Provider with the below findings:        Interpretation per the Radiologist below, if available at the time of this note:    CTA Head Neck W/Contrast   Final Result   No apparent arterial high grade stenosis, occlusion or aneurysm within the   head or neck.         CT Head W/O Contrast   Final Result   No acute intracranial abnormality.           No results found.    No results found.    PROCEDURES   Unless otherwise noted below, none     Procedures    CRITICAL CARE TIME (.cctime)         EMERGENCY DEPARTMENT COURSE and DIFFERENTIAL DIAGNOSIS/MDM:   Vitals:    Vitals:    06/05/24 1656   BP: 131/77   Pulse: 53   Resp: 18   Temp: 97.9 °F (36.6 °C)   TempSrc: Oral   SpO2: 97%   Weight: 97.5 kg (215 lb)   Height: 1.727 m (5' 8\")       Patient was given the following medications:  Medications   meclizine (ANTIVERT) tablet 25 mg (25 mg Oral Given 6/5/24 1703)   ondansetron (ZOFRAN-ODT) disintegrating tablet 4 mg (4 mg Oral Given 6/5/24 1704)   acetaminophen (TYLENOL) tablet 1,000 mg (1,000 mg Oral Given 6/5/24 1703)   iopamidol (ISOVUE-370) 76 % injection 75 mL (75 mLs IntraVENous Given 6/5/24 1747)             Is this patient to be included in the SEP-1 Core Measure due to severe sepsis or septic shock?   No   Exclusion criteria - the patient is NOT to be included

## 2024-06-07 LAB
EKG ATRIAL RATE: 66 BPM
EKG DIAGNOSIS: NORMAL
EKG P AXIS: -22 DEGREES
EKG P-R INTERVAL: 192 MS
EKG Q-T INTERVAL: 428 MS
EKG QRS DURATION: 96 MS
EKG QTC CALCULATION (BAZETT): 448 MS
EKG R AXIS: 29 DEGREES
EKG T AXIS: 1 DEGREES
EKG VENTRICULAR RATE: 66 BPM

## 2024-06-07 PROCEDURE — 93010 ELECTROCARDIOGRAM REPORT: CPT | Performed by: INTERNAL MEDICINE

## 2025-02-18 ENCOUNTER — HOSPITAL ENCOUNTER (OUTPATIENT)
Dept: CT IMAGING | Age: 45
Discharge: HOME | End: 2025-02-18
Attending: INTERNAL MEDICINE
Payer: COMMERCIAL

## 2025-02-18 DIAGNOSIS — R10.11 RIGHT UPPER QUADRANT PAIN: ICD-10-CM

## 2025-02-18 PROCEDURE — 6360000004 HC RX CONTRAST MEDICATION: Performed by: INTERNAL MEDICINE

## 2025-02-18 PROCEDURE — 74177 CT ABD & PELVIS W/CONTRAST: CPT

## 2025-02-18 RX ORDER — IOPAMIDOL 755 MG/ML
75 INJECTION, SOLUTION INTRAVASCULAR
Status: COMPLETED | OUTPATIENT
Start: 2025-02-18 | End: 2025-02-18

## 2025-02-18 RX ADMIN — IOHEXOL 25 ML: 350 INJECTION, SOLUTION INTRAVENOUS at 08:31

## 2025-02-18 RX ADMIN — IOPAMIDOL 75 ML: 755 INJECTION, SOLUTION INTRAVENOUS at 08:31

## 2025-05-20 NOTE — PROGRESS NOTES
Problem: Discharge Planning  Goal: Discharge to home or other facility with appropriate resources  5/20/2025 1342 by Lelia Haywood RN  Outcome: Completed  5/20/2025 0614 by Tani Vieira RN  Outcome: Progressing     Problem: Safety - Adult  Goal: Free from fall injury  5/20/2025 1342 by Lelia Haywood RN  Outcome: Completed  5/20/2025 0614 by Tani Vieira, RN  Outcome: Progressing     Problem: ABCDS Injury Assessment  Goal: Absence of physical injury  5/20/2025 1342 by Lelia Haywood RN  Outcome: Completed  5/20/2025 0614 by Tani Vieira, RN  Outcome: Progressing      [facility]  TELEMEDICINE Progress Note  Jovan Hines, DO      The risks and benefits of converting to a virtual visit were discussed in light of the current infectious disease epidemic. Patient also understood that insurance coverage and co-pays are up to their individual insurance plans. Patient identification was verified at the start of the visit. Bhumika Camacho  1980    09/18/20    Patient location: Home address on file  Provider location: Physician's home    Chief Complaint:   Bhumika Camacho is a 45 y.o. patient who is here for LLQ pain        HPI:   Left lower quadrant pain under rib cage feels like this after burping or drinking something and at times feels like he is going to pass out. He has a headache that goes away with a nap    Left side and radiates to the back. When drinks a bottle of water, in 45 minutes to 1 hour he will use restroom quickly. Does not drink alcohol. ROS negative for  vision changes, chest pain, shortness of breath, abdominal pain, urinary sx, bowel changes. Past medical, surgical, and social history reviewed. Medications and allergies reviewed. No Known Allergies  Prior to Visit Medications    Medication Sig Taking? Authorizing Provider   pantoprazole (PROTONIX) 40 MG tablet Take 1 tablet by mouth every morning (before breakfast) Yes Aleyda Cabello, DO          Patient-Reported Vitals 9/18/2020   Patient-Reported Weight 210   Patient-Reported Height 5'8\"      There were no vitals filed for this visit.    Wt Readings from Last 3 Encounters:   08/31/20 215 lb (97.5 kg)   02/20/20 215 lb 8 oz (97.8 kg)   11/01/19 217 lb (98.4 kg)     BP Readings from Last 3 Encounters:   08/31/20 122/74   02/20/20 (!) 122/90   02/06/20 110/84       Patient Active Problem List   Diagnosis    Gout    Chronic back pain    Impingement syndrome, shoulder    Vitamin D deficiency    Elevated glucose    Prediabetes       Immunization History   Administered Date(s) Administered    Tdap (Boostrix, Adacel) 01/25/2017       Past Medical History:   Diagnosis Date    GERD (gastroesophageal reflux disease)     Gout     Influenza 02/25/2017    Prediabetes 02/2020    Vitamin D deficiency 02/2020     Past Surgical History:   Procedure Laterality Date    DENTAL SURGERY      root canal / cracked tooth    OTHER SURGICAL HISTORY      left shoulder - bone spurs    UPPER GASTROINTESTINAL ENDOSCOPY N/A 10/30/2019    EGD BIOPSY performed by Tad Meredith DO at [facility] EXTRACTION      age late 25s, in [facility]     Family History   Problem Relation Age of Onset    Alcohol Abuse Father     Stroke Mother     Other Brother         gout    Diabetes Maternal Uncle      Social History     Socioeconomic History    Marital status: Single     Spouse name: Not on file    Number of children: 0    Years of education: Not on file    Highest education level: Not on file   Occupational History    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 states he stopped a couple of days ago   Substance and Sexual Activity    Alcohol use: Not Currently     Comment: binge drinker on weekends    Drug use: Not Currently    Sexual activity: Not on file   Lifestyle    Physical activity     Days per week: Not on file     Minutes per session: Not on file    Stress: Not on file   Relationships    Social connections     Talks on phone: Not on file     Gets together: Not on file     Attends [employer] service: Not on file     Active member of club or organization: Not on file     Attends meetings of clubs or organizations: Not on file     Relationship status: Not on file    Intimate partner violence     Fear of current or ex partner: Not on file     Emotionally abused: Not on file     Physically abused: Not on file     Forced sexual activity: Not on file   Other Topics Concern    Not on file   Social History Narrative    Not on file       O: There were no vitals taken for this visit. Physical Exam  PHYSICAL EXAMINATION:  [ INSTRUCTIONS:  \"[x]\" Indicates a positive item  \"[]\" Indicates a negative item  -- DELETE ALL ITEMS NOT EXAMINED]  Vital Signs: (As obtained by patient/caregiver or practitioner observation)    Constitutional: [x] Appears well-developed and well-nourished [x] No apparent distress      [] Abnormal-   Mental status  [x] Alert and awake  [x] Oriented to person/place/time [x]Able to follow commands      Eyes:  EOM    [x]  Normal  [] Abnormal-  Sclera  [x]  Normal  [] Abnormal -         Discharge [x]  None visible  [] Abnormal -    HENT:   [x] Normocephalic, atraumatic.   [] Abnormal   [] Mouth/Throat: Mucous membranes are moist.     External Ears [x] Normal  [] Abnormal-     Neck: [x] No visualized mass     Pulmonary/Chest: [x] Respiratory effort normal.  [x] No visualized signs of difficulty breathing or respiratory distress        [] Abnormal-      Musculoskeletal:   [] Normal gait with no signs of ataxia         [x] Normal range of motion of neck        [] Abnormal-       Neurological:        [x] No Facial Asymmetry (Cranial nerve 7 motor function) (limited exam to video visit)          [x] No gaze palsy        [] Abnormal-         Skin:        [x] No significant exanthematous lesions or discoloration noted on facial skin         [] Abnormal-            Psychiatric:       [x] Normal Affect [] No Hallucinations        [] Abnormal-     Other pertinent observable physical exam findings- mild TTP Of the epigastrum    Due to this being a TeleHealth encounter, evaluation of the following organ systems is limited: Vitals/Constitutional/EENT/Resp/CV/GI//MS/Neuro/Skin/Heme-Lymph-Imm. ASSESSMENT   Diagnosis Orders   1. Abdominal pain, unspecified abdominal location  H. Pylori Breath Test, Adult    COMPREHENSIVE METABOLIC PANEL    LIPASE    COMPREHENSIVE METABOLIC PANEL   2. Frequent urination  Hemoglobin A1C     DDx of pancreatitis vs PUD. PLAN      Will get labwork done. If applicable, see additional patient information and instructions under \"Patient Instructions. \"    Return if symptoms worsen or fail to improve. Patient Instructions       NO APPOINTMENT NECESSARY  WE ACCEPT ALL MAJOR INSURANCE PLANS  WE ACCEPT SELF PAYING PATIENTS  CALL [phone] FOR MORE INFORMATION    [facility]  [address], [address], [address]  Phone: [phone] Fax: [phone]  Mon.-Fri. 7 a.m.-5 p.m. Sat. 8 a.m.[facility] [place] FOR COGNITIVE DISORDERS  [address] [facility]  [facility]  Phone: [phone] and [phone]  Fax: [phone]  Mon.-Fri. 7:30 a.m.-4 p.m. [address], [facility] [address]  Phone: [phone]  Mon.-Fri. 7:30 a.m.-4 p.m. [facility] [address] [place] [address]. [address]  [place], [address]  Phone [phone]  Fax: [phone]  Mon-Fri 8 a.m.-5:30 p.m. [address] [facility]  [address] [facility], [address] [facility]  Phone: [phone]  Fax [phone]  Mon-Fri 7:30 a.m - 4 p.m. [facility]  [address] [facility], [address], [address]  Phone: [phone]  Mon., Tue., Thu., Fri. 7:30 a.m.-5 p.m.  Olegario Dumont. 7:30 a.m.[facility]  [address]  [place], [address] [place] [address]  Phone: [phone] Fax: [phone]  Mon.-Fri. 7:30 a.m.-4 p.m.     [address] [place] [address] and Lab Services  [facility], [address] [facility], [place] [address]  Phone: [phone] Fax: [phone]  Mon.-Fri. 8 a.m.-4:30 p.m. [address] [facility], [address]  Phone: [phone]  Mon.-Fri. 7:30 a.m.-4 p.m. [address]  [address] [facility]  Phone: [phone] Fax: [phone]  Mon.-Fri. 7 a.m.-5 p.m. Sat. 8 a.m.-Noon [facility] [place], [address] [place] [address]  Phone: [phone] Fax: [phone]  Mon.-Fri. 7 a.m.-5 p.m. [facility]  [address] [facility], [address] [facility]  Phone: [phone] Fax: [phone]  Mon.-Fri. 7 a.m.-5 p.m. [facility] [employer]  [address]  Phone: [phone]  Mon.-Fri. 6:30 a.m.-6 p.m. Sat. 7 a.m.-1 p.m. [place] [address], [place]  Phone: [phone]  [facility] 24/7    [address], [address] [place] [address]  Phone: [phone]  Mon.-Fri. 6 a.m.-7 p.m. Sat. 7 a.m.-3 p.m. [facility]  [address] [facility]  Phone: [phone]  Mon.-Fri. 6 a.m.-11 p.m.  Sat.-Sun. 6:30 a.m.-11 p.m. Rehana Yanez and [facility] [place]  [address]  Phone: [phone]  Mon.-Fri. 9 a.m.- 1 p.m. [address] [facility] [address]  Phone: [phone]  Open 24/7    [facility]  [address], Ben Drummond [facility]  Phone: [phone]  Mon.-Fri. 6:30 a.m.-6:30 p.m. Sat. 8 a.m.-Noon    [address] [place] [address]  Phone: [phone] Fax: [phone]  Mon.-Fri. 8:30 a.m.-5 p.m. [facility]  [address] [place] [address]  Phone: [phone] Fax: [phone]  Mon.-Fri. 8 a.m.-4:30 p.m. [address] [place] [address] [address]  Phone: [phone]  Fax: [phone]  Mon-Fri 7:30 am 4 p.m. Please call ahead to check hours. TOTAL TIME (in minutes) SPENT ON CONFERENCIN    Please note a portion of this chart was generated using dragon dictation software. Although every effort was made to ensure the accuracy of this automated transcription, some errors in transcription may have occurred. Pursuant to the emergency declaration under the [address] [place] [address] waiver authority and the [employer] Act, this Virtual  Visit was conducted, with patient's consent, to reduce the patient's risk of exposure to COVID-19 and provide continuity of care for an established patient. Services were provided through a video synchronous discussion virtually to substitute for in-person clinic visit. Patient was instructed that the AVS is available on My Chart or was emailed to the patient if not on My Chart. Lab orders were emailed to patient if they do not use a [facility] lab. Any work notes were sent to patient through My Chart or email.

## 2025-08-05 ENCOUNTER — OFFICE VISIT (OUTPATIENT)
Age: 45
End: 2025-08-05
Payer: COMMERCIAL

## 2025-08-05 VITALS
HEIGHT: 68 IN | SYSTOLIC BLOOD PRESSURE: 112 MMHG | HEART RATE: 83 BPM | WEIGHT: 208.11 LBS | DIASTOLIC BLOOD PRESSURE: 84 MMHG | BODY MASS INDEX: 31.54 KG/M2 | OXYGEN SATURATION: 98 %

## 2025-08-05 DIAGNOSIS — R42 VERTIGO: ICD-10-CM

## 2025-08-05 DIAGNOSIS — G47.00 INSOMNIA, UNSPECIFIED TYPE: ICD-10-CM

## 2025-08-05 DIAGNOSIS — R42 DISEQUILIBRIUM: ICD-10-CM

## 2025-08-05 DIAGNOSIS — G47.33 OSA (OBSTRUCTIVE SLEEP APNEA): Primary | ICD-10-CM

## 2025-08-05 PROCEDURE — G2211 COMPLEX E/M VISIT ADD ON: HCPCS | Performed by: STUDENT IN AN ORGANIZED HEALTH CARE EDUCATION/TRAINING PROGRAM

## 2025-08-05 PROCEDURE — 99204 OFFICE O/P NEW MOD 45 MIN: CPT | Performed by: STUDENT IN AN ORGANIZED HEALTH CARE EDUCATION/TRAINING PROGRAM

## 2025-08-05 RX ORDER — TRAZODONE HYDROCHLORIDE 50 MG/1
50 TABLET ORAL NIGHTLY
Qty: 90 TABLET | Refills: 1 | Status: SHIPPED | OUTPATIENT
Start: 2025-08-05

## 2025-08-05 RX ORDER — SUCRALFATE 1 G/1
TABLET ORAL
COMMUNITY
Start: 2025-05-28

## 2025-08-05 ASSESSMENT — SLEEP AND FATIGUE QUESTIONNAIRES
HOW LIKELY ARE YOU TO NOD OFF OR FALL ASLEEP WHILE SITTING QUIETLY AFTER LUNCH WITHOUT ALCOHOL: HIGH CHANCE OF DOZING
HOW LIKELY ARE YOU TO NOD OFF OR FALL ASLEEP WHILE SITTING AND TALKING TO SOMEONE: MODERATE CHANCE OF DOZING
HOW LIKELY ARE YOU TO NOD OFF OR FALL ASLEEP WHILE SITTING INACTIVE IN A PUBLIC PLACE: MODERATE CHANCE OF DOZING
HOW LIKELY ARE YOU TO NOD OFF OR FALL ASLEEP IN A CAR, WHILE STOPPED FOR A FEW MINUTES IN TRAFFIC: SLIGHT CHANCE OF DOZING
HOW LIKELY ARE YOU TO NOD OFF OR FALL ASLEEP WHILE LYING DOWN TO REST IN THE AFTERNOON WHEN CIRCUMSTANCES PERMIT: HIGH CHANCE OF DOZING
HOW LIKELY ARE YOU TO NOD OFF OR FALL ASLEEP WHILE WATCHING TV: HIGH CHANCE OF DOZING
HOW LIKELY ARE YOU TO NOD OFF OR FALL ASLEEP WHEN YOU ARE A PASSENGER IN A CAR FOR AN HOUR WITHOUT A BREAK: MODERATE CHANCE OF DOZING
HOW LIKELY ARE YOU TO NOD OFF OR FALL ASLEEP WHILE SITTING AND READING: HIGH CHANCE OF DOZING
ESS TOTAL SCORE: 19

## 2025-08-06 ENCOUNTER — TELEPHONE (OUTPATIENT)
Age: 45
End: 2025-08-06

## (undated) DEVICE — FORCEPS BX L240CM DIA2.4MM L NDL RAD JAW 4 133340